# Patient Record
Sex: FEMALE | Race: WHITE | NOT HISPANIC OR LATINO | URBAN - METROPOLITAN AREA
[De-identification: names, ages, dates, MRNs, and addresses within clinical notes are randomized per-mention and may not be internally consistent; named-entity substitution may affect disease eponyms.]

---

## 2017-11-02 ENCOUNTER — OUTPATIENT (OUTPATIENT)
Dept: OUTPATIENT SERVICES | Facility: HOSPITAL | Age: 47
LOS: 1 days | Discharge: HOME | End: 2017-11-02

## 2017-11-03 DIAGNOSIS — F11.10 OPIOID ABUSE, UNCOMPLICATED: ICD-10-CM

## 2023-02-02 ENCOUNTER — EMERGENCY (EMERGENCY)
Facility: HOSPITAL | Age: 53
LOS: 1 days | Discharge: HOME | End: 2023-02-02
Attending: EMERGENCY MEDICINE | Admitting: EMERGENCY MEDICINE
Payer: MEDICARE

## 2023-02-02 VITALS
RESPIRATION RATE: 18 BRPM | OXYGEN SATURATION: 100 % | DIASTOLIC BLOOD PRESSURE: 75 MMHG | TEMPERATURE: 98 F | SYSTOLIC BLOOD PRESSURE: 148 MMHG | HEART RATE: 91 BPM

## 2023-02-02 PROCEDURE — 99284 EMERGENCY DEPT VISIT MOD MDM: CPT

## 2023-02-02 RX ADMIN — Medication 100 MILLIGRAM(S): at 16:30

## 2023-02-02 NOTE — ED PROVIDER NOTE - NSICDXPASTMEDICALHX_GEN_ALL_CORE_FT
PAST MEDICAL HISTORY:  Bipolar disorder     Opioid use disorder, moderate, in early remission, on maintenance therapy, dependence     RA (rheumatoid arthritis)

## 2023-02-02 NOTE — ED PROVIDER NOTE - CLINICAL SUMMARY MEDICAL DECISION MAKING FREE TEXT BOX
51 y/o F with history of recurrent MRSA infections coming in with multiple worsening erythematous lesions with associated drainage and tenderness. Pt denies fever, chills. PE with multiple lesions in various phases of healing with the largest on the left lower calf with significant surrounding erythema.     Plan: xray lower LT leg, d/c with doxycycline.

## 2023-02-02 NOTE — ED PROVIDER NOTE - PROGRESS NOTE DETAILS
Robb PGY1: Patient denies X-ray. Patient counseled on the importance of compliance with full course of doxycycline. Verbalized understanding.

## 2023-02-02 NOTE — ED ADULT TRIAGE NOTE - CHIEF COMPLAINT QUOTE
pt admits to last heroin use a few  days ago. Pt she is here for left leg open sores that appear infected. Pts leg is swollen

## 2023-02-02 NOTE — ED PROVIDER NOTE - SKIN, MLM
Pt with open lesion on anterior left calf with significant surrounding erythema, no active drainage. Pt with similar smaller lesions on RT posterior shoulder and 2 on lower back. Multiple scattered lesions on B/L lower legs in different stages of healing.

## 2023-02-02 NOTE — ED PROVIDER NOTE - ATTENDING CONTRIBUTION TO CARE
The patient is a 52y Female who has a past medical and surgery history of RA Substance abuse BipolarPTED with multiple pustules in various stages of healing; the largest and the one prompting the ED isit on her left lower leg; no f,c,streaking up leg wound was responding to an incomplete prescription of doxy but recurred when she ran out   Vital Signs Stable  PE: as described; my additions and exceptions are noted in the chart     IMPRESSION/RISK:  Dx= CAMRSA    Consideration include Doxycycline x 10 days with refill ID referral  Plan  as above  RTEDprn The patient is a 52y Female who has a past medical and surgery history of RA Substance abuse Bipolar PTED with multiple pustules in various stages of healing; the largest and the one prompting the ED visit on her left lower leg; no f,c,streaking up leg wound was responding to an incomplete prescription of doxy but recurred when she ran out   Vital Signs Stable  PE: as described; my additions and exceptions are noted in the chart     IMPRESSION/RISK:  Dx= CAMRSA    Consideration include Doxycycline x 10 days with refill ID referral  Plan  as above  RTEDprn

## 2023-02-02 NOTE — ED PROVIDER NOTE - PATIENT PORTAL LINK FT
You can access the FollowMyHealth Patient Portal offered by [facility] by registering at the following website: [email or web address]. By joining [employer]’s FollowMyHealth portal, you will also be able to view your health information using other applications (apps) compatible with our system.

## 2023-07-16 ENCOUNTER — EMERGENCY (EMERGENCY)
Facility: HOSPITAL | Age: 53
LOS: 1 days | Discharge: LEFT BEFORE DISCHARGE | End: 2023-07-16
Attending: EMERGENCY MEDICINE | Admitting: EMERGENCY MEDICINE
Payer: MEDICARE

## 2023-07-16 VITALS
OXYGEN SATURATION: 96 % | TEMPERATURE: 98 F | HEART RATE: 105 BPM | DIASTOLIC BLOOD PRESSURE: 101 MMHG | RESPIRATION RATE: 18 BRPM | SYSTOLIC BLOOD PRESSURE: 157 MMHG

## 2023-07-16 VITALS
HEART RATE: 100 BPM | DIASTOLIC BLOOD PRESSURE: 73 MMHG | OXYGEN SATURATION: 96 % | SYSTOLIC BLOOD PRESSURE: 135 MMHG | TEMPERATURE: 98 F | RESPIRATION RATE: 16 BRPM

## 2023-07-16 PROBLEM — M06.9 RHEUMATOID ARTHRITIS, UNSPECIFIED: Chronic | Status: ACTIVE | Noted: 2023-02-02

## 2023-07-16 PROBLEM — F31.9 BIPOLAR DISORDER, UNSPECIFIED: Chronic | Status: ACTIVE | Noted: 2023-02-02

## 2023-07-16 LAB
ALBUMIN SERPL ELPH-MCNC: 4.4 G/DL — SIGNIFICANT CHANGE UP (ref 3.3–5)
ALP SERPL-CCNC: 77 U/L — SIGNIFICANT CHANGE UP (ref 40–120)
ALT FLD-CCNC: 20 U/L — SIGNIFICANT CHANGE UP (ref 4–33)
ANION GAP SERPL CALC-SCNC: 15 MMOL/L — HIGH (ref 7–14)
AST SERPL-CCNC: 34 U/L — HIGH (ref 4–32)
BASOPHILS # BLD AUTO: 0.04 K/UL — SIGNIFICANT CHANGE UP (ref 0–0.2)
BASOPHILS NFR BLD AUTO: 0.7 % — SIGNIFICANT CHANGE UP (ref 0–2)
BILIRUB SERPL-MCNC: 0.7 MG/DL — SIGNIFICANT CHANGE UP (ref 0.2–1.2)
BUN SERPL-MCNC: 10 MG/DL — SIGNIFICANT CHANGE UP (ref 7–23)
CALCIUM SERPL-MCNC: 9.5 MG/DL — SIGNIFICANT CHANGE UP (ref 8.4–10.5)
CHLORIDE SERPL-SCNC: 99 MMOL/L — SIGNIFICANT CHANGE UP (ref 98–107)
CO2 SERPL-SCNC: 23 MMOL/L — SIGNIFICANT CHANGE UP (ref 22–31)
CREAT SERPL-MCNC: 0.81 MG/DL — SIGNIFICANT CHANGE UP (ref 0.5–1.3)
EGFR: 88 ML/MIN/1.73M2 — SIGNIFICANT CHANGE UP
EOSINOPHIL # BLD AUTO: 0.1 K/UL — SIGNIFICANT CHANGE UP (ref 0–0.5)
EOSINOPHIL NFR BLD AUTO: 1.8 % — SIGNIFICANT CHANGE UP (ref 0–6)
GLUCOSE SERPL-MCNC: 92 MG/DL — SIGNIFICANT CHANGE UP (ref 70–99)
HCT VFR BLD CALC: 43.6 % — SIGNIFICANT CHANGE UP (ref 34.5–45)
HGB BLD-MCNC: 13.7 G/DL — SIGNIFICANT CHANGE UP (ref 11.5–15.5)
IANC: 3.39 K/UL — SIGNIFICANT CHANGE UP (ref 1.8–7.4)
IMM GRANULOCYTES NFR BLD AUTO: 0.2 % — SIGNIFICANT CHANGE UP (ref 0–0.9)
LYMPHOCYTES # BLD AUTO: 1.53 K/UL — SIGNIFICANT CHANGE UP (ref 1–3.3)
LYMPHOCYTES # BLD AUTO: 27.8 % — SIGNIFICANT CHANGE UP (ref 13–44)
MAGNESIUM SERPL-MCNC: 2.2 MG/DL — SIGNIFICANT CHANGE UP (ref 1.6–2.6)
MCHC RBC-ENTMCNC: 26 PG — LOW (ref 27–34)
MCHC RBC-ENTMCNC: 31.4 GM/DL — LOW (ref 32–36)
MCV RBC AUTO: 82.9 FL — SIGNIFICANT CHANGE UP (ref 80–100)
MONOCYTES # BLD AUTO: 0.44 K/UL — SIGNIFICANT CHANGE UP (ref 0–0.9)
MONOCYTES NFR BLD AUTO: 8 % — SIGNIFICANT CHANGE UP (ref 2–14)
NEUTROPHILS # BLD AUTO: 3.39 K/UL — SIGNIFICANT CHANGE UP (ref 1.8–7.4)
NEUTROPHILS NFR BLD AUTO: 61.5 % — SIGNIFICANT CHANGE UP (ref 43–77)
NRBC # BLD: 0 /100 WBCS — SIGNIFICANT CHANGE UP (ref 0–0)
NRBC # FLD: 0 K/UL — SIGNIFICANT CHANGE UP (ref 0–0)
PHOSPHATE SERPL-MCNC: 4 MG/DL — SIGNIFICANT CHANGE UP (ref 2.5–4.5)
PLATELET # BLD AUTO: 166 K/UL — SIGNIFICANT CHANGE UP (ref 150–400)
POTASSIUM SERPL-MCNC: 3.8 MMOL/L — SIGNIFICANT CHANGE UP (ref 3.5–5.3)
POTASSIUM SERPL-SCNC: 3.8 MMOL/L — SIGNIFICANT CHANGE UP (ref 3.5–5.3)
PROT SERPL-MCNC: 8.1 G/DL — SIGNIFICANT CHANGE UP (ref 6–8.3)
RBC # BLD: 5.26 M/UL — HIGH (ref 3.8–5.2)
RBC # FLD: 14.5 % — SIGNIFICANT CHANGE UP (ref 10.3–14.5)
SODIUM SERPL-SCNC: 137 MMOL/L — SIGNIFICANT CHANGE UP (ref 135–145)
WBC # BLD: 5.51 K/UL — SIGNIFICANT CHANGE UP (ref 3.8–10.5)
WBC # FLD AUTO: 5.51 K/UL — SIGNIFICANT CHANGE UP (ref 3.8–10.5)

## 2023-07-16 PROCEDURE — 99284 EMERGENCY DEPT VISIT MOD MDM: CPT

## 2023-07-16 PROCEDURE — 93010 ELECTROCARDIOGRAM REPORT: CPT

## 2023-07-16 NOTE — ED PROVIDER NOTE - OBJECTIVE STATEMENT
53 yo Female with PMhx MRSA cellulitis, RA, IVDU, opioid use disorder on methadone, bipolar disorder, p/w wounds on her bilateral forearms after IVDU 3 days ago. Patient states that she used what she thought to be heroin 3 days ago. Had the specimen tested and was found to have xylazine. Patient with ulcerative lesions on bilateral arms. Patient states that she use small amount of the IV drug in multiple locations on her bilateral arms. Patient denies fever, nausea/vomiting, pain of the area, CP, SOB, abdominal pain.

## 2023-07-16 NOTE — ED ADULT NURSE REASSESSMENT NOTE - NS ED NURSE REASSESS COMMENT FT1
at 8:40 navigation arrives to take pt to vascular studies, pt not found int he assigned area, bathrooms, and  radiology suites searched pt not located, pt's contact number called message left, no call back received. NANETTE Ramos mad aware, Dr Walker made aware, At this time concern for  pt eloping with IV line in place, pt with Hx of heroin use in the past.

## 2023-07-16 NOTE — ED ADULT NURSE REASSESSMENT NOTE - NS ED NURSE REASSESS COMMENT FT1
At 08:10: Received pt in bed  A and Ox 3 in NAD, resting at the edge of the bed, ambulatory at time, update pt on need for vascular studies pt verbalized underscan, endorses no complaints at  this time.

## 2023-07-16 NOTE — ED ADULT NURSE REASSESSMENT NOTE - NS ED NURSE REASSESS COMMENT FT1
ANM: Called [address] and spoke to Reji #[id number] who states wellness check done at 14:30. diana made aware.
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
## 2023-07-16 NOTE — ED PROVIDER NOTE - CLINICAL SUMMARY MEDICAL DECISION MAKING FREE TEXT BOX
51 yo Female with PMhx MRSA cellulitis, RA, IVDU, opioid use disorder on methadone, bipolar disorder, p/w wounds on her bilateral forearms after IVDU 3 days ago. Vitals with mild tachycardia to low 100s, afebrile, BP stable. bilateral arms with point wounds of forearms with some erythema surrounding wounds. hardened skins connecting between each wound. The differential diagnosis includes but is not limited to necrosis 2/2 xylazine, micro-abscesses, DVT, superficial thrombophlebitis. Will get labs, US.

## 2023-07-16 NOTE — ED ADULT TRIAGE NOTE - CHIEF COMPLAINT QUOTE
PA approved and pt notified to call   pt c/o infection in right arm from IV drug use. reports last drug use (heroin) 3 days ago. pt takes methadone (clinic in [place]). denies nausea, vomiting, fever, chills. history of asthma.

## 2023-07-16 NOTE — ED ADULT NURSE NOTE - OBJECTIVE STATEMENT
A&Ox4. Past medical history of asthma. Patient c/o infection in right arm from IV drug use. Reports last drug use (heroin) 3 days ago. Patient takes methadone (clinic in [place]). Denies nausea, vomiting, fever, chills. history of asthma. Respirations even and unlabored. Labs obtained, awaiting results. Awaiting diagnostic testing. Safety precautions in place, bed in lowest position and oriented to call bell.

## 2023-07-16 NOTE — ED PROVIDER NOTE - ATTENDING CONTRIBUTION TO CARE
52-year-old female with history of opioid use disorder enrolled in methadone program, MRSA, bipolar disorder, rheumatoid arthritis, presenting to ER with right forearm pain with nodular areas of swelling and discoloration at site of subcutaneous heroin injection from 3 days ago.  Patient notes she tested her heroin with a strip and detected xylazine contamination in the hair when she injected.  Denies any proximal right arm pain, swelling, redness.  No shortness of breath or chest pain, no fevers, no other areas of injection or symptoms    Exam  Flexural surface of right forearm with nodular brown to reddish colored areas with no significant tenderness or fluctuance or crepitus  Compartments soft  2+ radial pulse on right  Lungs clear bilaterally    Assessment/plan  Concern for possible xylazine side effect with skin necrosis, small abscesses, thrombophlebitis/superficial thrombus  We will get labs to screen for infection, upper extremity ultrasound  Dispo pending results

## 2023-07-16 NOTE — ED ADULT NURSE NOTE - CHIEF COMPLAINT QUOTE
pt c/o infection in right arm from IV drug use. reports last drug use (heroin) 3 days ago. pt takes methadone (clinic in [place]). denies nausea, vomiting, fever, chills. history of asthma.

## 2023-07-16 NOTE — ED ADULT NURSE REASSESSMENT NOTE - NS ED NURSE REASSESS COMMENT FT1
ANM - Multiple attempts to call patient, no response. 911 called by author of this note and spoke to  #[phone] for wellness check. Nurse manager and a.d.n aware.

## 2023-07-16 NOTE — ED PROVIDER NOTE - PHYSICAL EXAMINATION
Const: not in acute distress  Eyes: no conjunctival injection  HEENT: Head NCAT, Moist MM.  Neck: Trachea midline.   CVS: +S1/S2, Peripheral pulses 2+ and equal in all extremities.  RESP: Unlabored respiratory effort. Clear to auscultation bilaterally.  GI: Nontender/Nondistended, No CVA tenderness b/l.   MSK: Normocephalic/Atraumatic, No Lower Extremities edema b/l.   Skin: bilateral forearms with circular 2-3 cm wounds with some erythema surrounding wounds, hardened tubular structure connecting between each wound  Neuro: Motor & Sensation grossly intact.  Psych: Awake, Alert, & Cooperative

## 2023-07-28 ENCOUNTER — EMERGENCY (EMERGENCY)
Facility: HOSPITAL | Age: 53
LOS: 1 days | Discharge: HOME | End: 2023-07-28
Attending: EMERGENCY MEDICINE | Admitting: EMERGENCY MEDICINE
Payer: MEDICARE

## 2023-07-28 VITALS
RESPIRATION RATE: 16 BRPM | SYSTOLIC BLOOD PRESSURE: 154 MMHG | DIASTOLIC BLOOD PRESSURE: 100 MMHG | HEART RATE: 119 BPM | TEMPERATURE: 98 F | OXYGEN SATURATION: 99 %

## 2023-07-28 PROCEDURE — 99284 EMERGENCY DEPT VISIT MOD MDM: CPT

## 2023-07-28 NOTE — ED PROVIDER NOTE - CLINICAL SUMMARY MEDICAL DECISION MAKING FREE TEXT BOX
53-year-old female with past medical history of MRSA cellulitis, RA on methotrexate, IV drug use last used 3 days ago, opioid use disorder, bipolar disorder presenting to emergency department with abscess to right side of neck that has been increasing in size over the past 3 days.  States started as a ingrown hair on her neck that turned into a pimple that she popped.  Was seen in emergency department 2 weeks prior for ulcerative lesions on bilateral arms after injecting heroin found to be contaminated with xylazine.  Patient left without treatment at that time but she states she got very anxious, left department without informing anybody.  States abscess on neck is warm, red, tender.  Not actively draining.  Denies fever, headache, shortness of breath, chest pain, abdominal pain, nausea, vomiting, diarrhea.  States wounds on arms legs have improved and are no longer painful.    Patient tachycardic to 119 in triage, patient very anxious appearing likely contributing to elevated heart rate.  Patient is afebrile, rest of vital signs within normal limits.  On exam patient has roughly 2-3 cm area of induration and erythema and warmth to right anterior neck with appearance of abscess/cellulitis, no fluctuance appreciated.  Evaluated abscess with ultrasound, no drainable pocket.  Will require antibiotics, especially with patient's known history of MRSA, will Rx Bactrim.  Offered admission for cellulitis, labs, IV antibiotics.  Patient declining at this time stating she gets very anxious in hospitals and does not want to stay.  Patient alert and oriented with decision-making capacity, understands risks of leaving.  Gave return precautions, patient will return if symptoms not improving.  Answered all questions. Will dc with bactrim Rx. 53-year-old female with past medical history of MRSA cellulitis, RA on methotrexate, IV drug use last used 3 days ago, opioid use disorder, bipolar disorder presenting to emergency department with abscess to right side of neck that has been increasing in size over the past 3 days.  States started as a ingrown hair on her neck that turned into a pimple that she popped.  Was seen in emergency department 2 weeks prior for ulcerative lesions on bilateral arms after injecting heroin found to be contaminated with xylazine.  Patient left without treatment at that time but she states she got very anxious, left department without informing anybody.  States abscess on neck is warm, red, tender.  Not actively draining.  Denies fever, headache, shortness of breath, chest pain, abdominal pain, nausea, vomiting, diarrhea.  States wounds on arms legs have improved and are no longer painful.    Patient tachycardic to 119 in triage, patient very anxious appearing likely contributing to elevated heart rate. When examined in room, HR palpated radially to be 90. Patient is afebrile, rest of vital signs within normal limits.  On exam patient has roughly 2-3 cm area of induration and erythema and warmth to right anterior neck with appearance of abscess/cellulitis, no fluctuance appreciated.  Evaluated abscess with ultrasound, no drainable pocket.  Will require antibiotics, especially with patient's known history of MRSA, will Rx Bactrim.  Offered admission for cellulitis, labs, IV antibiotics.  Patient declining at this time stating she gets very anxious in hospitals and does not want to stay.  Patient alert and oriented with decision-making capacity, understands risks of leaving.  Gave return precautions, patient will return if symptoms not improving.  Answered all questions. Will dc with bactrim Rx.

## 2023-07-28 NOTE — ED ADULT TRIAGE NOTE - CHIEF COMPLAINT QUOTE
c/o redness ,swelling to neck x 3-4 days. PHx asthma, RA, Bipolar, anxiety. Right anterior neck noted to have redness, swelling and warm to touch.

## 2023-07-28 NOTE — ED PROVIDER NOTE - PATIENT PORTAL LINK FT
You can access the FollowMyHealth Patient Portal offered by [facility] by registering at the following website: [email or web address]. By joining [employer]’s FollowMyHealth portal, you will also be able to view your health information using other applications (apps) compatible with our system.

## 2023-07-28 NOTE — ED PROVIDER NOTE - ATTENDING CONTRIBUTION TO CARE
DR. RICHARDSON, ATTENDING MD-  I performed a face to face bedside interview with the patient regarding history of present illness, review of symptoms and past medical history. I completed an independent physical exam.  I have discussed the patient's plan of care with the resident.   Documentation as above in the note.    54 y/o female h/o ivda with r neck pain swelling redness.  Pt with area of induration and erythema, no fluctuance at r neck.  No issues breathing speaking swallowing.  Denies f/c.  Likely cellulitis.  Pt declines labs or admission.  Will give abx, advise close pcp f/u, to return immediately for worsening sx.

## 2023-07-28 NOTE — ED PROVIDER NOTE - PHYSICAL EXAMINATION
Gen: NAD  HEENT: Poor dentition. mucous membranes moist  CV: RRR, +S1/S2, no M/R/G, 2+ radial pulses b/l  Resp: CTAB, no W/R/R, no accessory muscle use, no increased work of breathing  GI: Abdomen soft non-distended, NTTP  Skin: 2-3cm area of induration, erythema, warmth with central ulceration to Right anterior neck without active drainge. +multiple healed skin ulcerations to b/l antecubital fossa and left shin.   Neuro: A&Ox4, following commands, moving all four extremities spontaneously  Psych: appropriate mood

## 2023-07-28 NOTE — ED PROVIDER NOTE - WET READ LAUNCH FT
There are no Wet Read(s) to document. Itraconazole Pregnancy And Lactation Text: This medication is Pregnancy Category C and it isn't know if it is safe during pregnancy. It is also excreted in breast milk.

## 2023-09-26 ENCOUNTER — EMERGENCY (EMERGENCY)
Facility: HOSPITAL | Age: 53
LOS: 1 days | Discharge: LEFT BEFORE DISCHARGE | End: 2023-09-26
Attending: STUDENT IN AN ORGANIZED HEALTH CARE EDUCATION/TRAINING PROGRAM | Admitting: EMERGENCY MEDICINE
Payer: MEDICARE

## 2023-09-26 VITALS
OXYGEN SATURATION: 100 % | HEART RATE: 90 BPM | SYSTOLIC BLOOD PRESSURE: 170 MMHG | DIASTOLIC BLOOD PRESSURE: 80 MMHG | TEMPERATURE: 98 F | RESPIRATION RATE: 17 BRPM

## 2023-09-26 VITALS
DIASTOLIC BLOOD PRESSURE: 83 MMHG | HEART RATE: 72 BPM | RESPIRATION RATE: 16 BRPM | SYSTOLIC BLOOD PRESSURE: 129 MMHG | OXYGEN SATURATION: 100 %

## 2023-09-26 LAB
ALBUMIN SERPL ELPH-MCNC: 3.9 G/DL — SIGNIFICANT CHANGE UP (ref 3.3–5)
ALP SERPL-CCNC: 84 U/L — SIGNIFICANT CHANGE UP (ref 40–120)
ALT FLD-CCNC: 18 U/L — SIGNIFICANT CHANGE UP (ref 4–33)
ANION GAP SERPL CALC-SCNC: 10 MMOL/L — SIGNIFICANT CHANGE UP (ref 7–14)
AST SERPL-CCNC: 21 U/L — SIGNIFICANT CHANGE UP (ref 4–32)
BASE EXCESS BLDV CALC-SCNC: 0.5 MMOL/L — SIGNIFICANT CHANGE UP (ref -2–3)
BASOPHILS # BLD AUTO: 0.04 K/UL — SIGNIFICANT CHANGE UP (ref 0–0.2)
BASOPHILS NFR BLD AUTO: 0.6 % — SIGNIFICANT CHANGE UP (ref 0–2)
BILIRUB SERPL-MCNC: 0.3 MG/DL — SIGNIFICANT CHANGE UP (ref 0.2–1.2)
BLOOD GAS VENOUS COMPREHENSIVE RESULT: SIGNIFICANT CHANGE UP
BUN SERPL-MCNC: 5 MG/DL — LOW (ref 7–23)
CALCIUM SERPL-MCNC: 9.4 MG/DL — SIGNIFICANT CHANGE UP (ref 8.4–10.5)
CHLORIDE BLDV-SCNC: 98 MMOL/L — SIGNIFICANT CHANGE UP (ref 96–108)
CHLORIDE SERPL-SCNC: 98 MMOL/L — SIGNIFICANT CHANGE UP (ref 98–107)
CO2 BLDV-SCNC: 30.2 MMOL/L — HIGH (ref 22–26)
CO2 SERPL-SCNC: 24 MMOL/L — SIGNIFICANT CHANGE UP (ref 22–31)
CREAT SERPL-MCNC: 0.77 MG/DL — SIGNIFICANT CHANGE UP (ref 0.5–1.3)
EGFR: 93 ML/MIN/1.73M2 — SIGNIFICANT CHANGE UP
EOSINOPHIL # BLD AUTO: 0.11 K/UL — SIGNIFICANT CHANGE UP (ref 0–0.5)
EOSINOPHIL NFR BLD AUTO: 1.6 % — SIGNIFICANT CHANGE UP (ref 0–6)
GAS PNL BLDV: 126 MMOL/L — LOW (ref 136–145)
GLUCOSE BLDV-MCNC: 81 MG/DL — SIGNIFICANT CHANGE UP (ref 70–99)
GLUCOSE SERPL-MCNC: 88 MG/DL — SIGNIFICANT CHANGE UP (ref 70–99)
HCO3 BLDV-SCNC: 28 MMOL/L — SIGNIFICANT CHANGE UP (ref 22–29)
HCT VFR BLD CALC: 42 % — SIGNIFICANT CHANGE UP (ref 34.5–45)
HCT VFR BLDA CALC: 41 % — SIGNIFICANT CHANGE UP (ref 34.5–46.5)
HGB BLD CALC-MCNC: 13.8 G/DL — SIGNIFICANT CHANGE UP (ref 11.7–16.1)
HGB BLD-MCNC: 13.7 G/DL — SIGNIFICANT CHANGE UP (ref 11.5–15.5)
IANC: 4.85 K/UL — SIGNIFICANT CHANGE UP (ref 1.8–7.4)
IMM GRANULOCYTES NFR BLD AUTO: 0.3 % — SIGNIFICANT CHANGE UP (ref 0–0.9)
LACTATE BLDV-MCNC: 1.7 MMOL/L — SIGNIFICANT CHANGE UP (ref 0.5–2)
LYMPHOCYTES # BLD AUTO: 1.5 K/UL — SIGNIFICANT CHANGE UP (ref 1–3.3)
LYMPHOCYTES # BLD AUTO: 21.2 % — SIGNIFICANT CHANGE UP (ref 13–44)
MCHC RBC-ENTMCNC: 27 PG — SIGNIFICANT CHANGE UP (ref 27–34)
MCHC RBC-ENTMCNC: 32.6 GM/DL — SIGNIFICANT CHANGE UP (ref 32–36)
MCV RBC AUTO: 82.7 FL — SIGNIFICANT CHANGE UP (ref 80–100)
MONOCYTES # BLD AUTO: 0.54 K/UL — SIGNIFICANT CHANGE UP (ref 0–0.9)
MONOCYTES NFR BLD AUTO: 7.6 % — SIGNIFICANT CHANGE UP (ref 2–14)
NEUTROPHILS # BLD AUTO: 4.85 K/UL — SIGNIFICANT CHANGE UP (ref 1.8–7.4)
NEUTROPHILS NFR BLD AUTO: 68.7 % — SIGNIFICANT CHANGE UP (ref 43–77)
NRBC # BLD: 0 /100 WBCS — SIGNIFICANT CHANGE UP (ref 0–0)
NRBC # FLD: 0 K/UL — SIGNIFICANT CHANGE UP (ref 0–0)
PCO2 BLDV: 59 MMHG — HIGH (ref 39–52)
PH BLDV: 7.29 — LOW (ref 7.32–7.43)
PLATELET # BLD AUTO: 190 K/UL — SIGNIFICANT CHANGE UP (ref 150–400)
PO2 BLDV: 41 MMHG — SIGNIFICANT CHANGE UP (ref 25–45)
POTASSIUM BLDV-SCNC: 4.5 MMOL/L — SIGNIFICANT CHANGE UP (ref 3.5–5.1)
POTASSIUM SERPL-MCNC: 4.7 MMOL/L — SIGNIFICANT CHANGE UP (ref 3.5–5.3)
POTASSIUM SERPL-SCNC: 4.7 MMOL/L — SIGNIFICANT CHANGE UP (ref 3.5–5.3)
PROT SERPL-MCNC: 7.6 G/DL — SIGNIFICANT CHANGE UP (ref 6–8.3)
RBC # BLD: 5.08 M/UL — SIGNIFICANT CHANGE UP (ref 3.8–5.2)
RBC # FLD: 13.5 % — SIGNIFICANT CHANGE UP (ref 10.3–14.5)
SAO2 % BLDV: 62.2 % — LOW (ref 67–88)
SODIUM SERPL-SCNC: 132 MMOL/L — LOW (ref 135–145)
WBC # BLD: 7.06 K/UL — SIGNIFICANT CHANGE UP (ref 3.8–10.5)
WBC # FLD AUTO: 7.06 K/UL — SIGNIFICANT CHANGE UP (ref 3.8–10.5)

## 2023-09-26 PROCEDURE — 99285 EMERGENCY DEPT VISIT HI MDM: CPT

## 2023-09-26 PROCEDURE — 70491 CT SOFT TISSUE NECK W/DYE: CPT | Mod: 26,MA

## 2023-09-26 RX ADMIN — Medication 100 MILLIGRAM(S): at 17:59

## 2023-09-26 NOTE — ED ADULT NURSE REASSESSMENT NOTE - NS ED NURSE REASSESS COMMENT FT1
Break RN: As per SUHAIL Morelos, pt does not want to stay. Pt to AMA. IV removed. Vitals performed. Ambulatory with a steady gait. No acute distress noted upon leaving ED.

## 2023-09-26 NOTE — ED PROVIDER NOTE - PHYSICAL EXAMINATION
Const: Well-nourished, Well-developed, appearing stated age.  Eyes: no conjunctival injection, and symmetrical lids.  HEENT: +  Right periorbital edema extending to the right scalp.  Neck: +R sided ttp, swelling. trachea midline.   CVS: +S1/S2, Radial and DP pulses 2+ b/l.   RESP: Unlabored respiratory effort. Clear to auscultation bilaterally.  GI: Nontender/Nondistended, No CVA tenderness b/l.   MSK: Normocephalic/Atraumatic, Lower Extremities w/o edema b/l.   Skin: Warm, dry and intact.   Neuro: Fluent Speech. Moving all extremities.   Psych: Awake, Alert, & Oriented (AAO) x3. Appropriate mood and affect.

## 2023-09-26 NOTE — ED PROVIDER NOTE - PATIENT PORTAL LINK FT
You can access the FollowMyHealth Patient Portal offered by [facility] by registering at the following website: [email or web address]. By joining [employer]’s FollowMyHealth portal, you will also be able to view your health information using other applications (apps) compatible with our system.
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
## 2023-09-26 NOTE — ED PROVIDER NOTE - OBJECTIVE STATEMENT
53-year-old female with past medical history of IV drug use, presenting with chief complaint of right-sided facial swelling extending from the right scalp to the right periorbital area, and the right neck.  No vision changes, no pain in extraocular movements.  States that she has had a few days of symptoms.  No fevers, chills, nausea, vomiting.  No other complaints at this time.  Patient has not used any IV drugs for the past few months.  Is now on methadone.

## 2023-09-26 NOTE — ED ADULT TRIAGE NOTE - CHIEF COMPLAINT QUOTE
Pt coming to ER c/o swelling and burning pain to her periorbital region that started two days ago. Noted to be warm to touch and causing redness to the right side of her face. Denies vision changes. Hx drug abuse, RA, HTN

## 2023-09-26 NOTE — ED ADULT NURSE NOTE - OBJECTIVE STATEMENT
Patient received in wellness, exam room 5. Patient A&Ox3 and ambulatory at baseline. Patient presents to the ED c/o right sided facial swelling. phx opioid use. Patient states approximately 2 days ago, she started to develop itching to right side of face. Patient states she is allergic to wool and helped her friend do laundry approximately 3 days ago; patient is unsure if that may have caused reaction. Right side of face and periorbital appears swolllen and red; patient denies vision changes, headache, lightheadedness, dizziness, fever/chills, nausea/vomiting. Patient denies pain and offers no complaints at this time. Respirations even and unlabored, no signs/symptoms of acute distress; patient denies dyspnea, shortness of breath, and chest pain. Patient is stable at this time. Steady gait observed.

## 2023-09-26 NOTE — ED PROVIDER NOTE - CLINICAL SUMMARY MEDICAL DECISION MAKING FREE TEXT BOX
Bryant Peoples, ED Attendin-year-old female presenting with chief complaint of right facial swelling neck swelling.  History of IV drug use.  On exam, vital signs stable, right periorbital swelling as well as right neck swelling as described above.  Concern for cellulitis versus abscess.  Given patient's history, will start patient on clindamycin.  Will obtain labs as well as CT of the max face and neck with IV contrast.  Reassess to dispo.

## 2023-09-26 NOTE — ED PROVIDER NOTE - NSFOLLOWUPINSTRUCTIONS_ED_ALL_ED_FT
Discharge Against Medical Advice WHAT YOU NEED TO KNOW:    Discharge against medical advice means that you choose to leave the hospital before your healthcare provider recommends that you do. Your healthcare provider may still need to diagnose or treat your condition or your treatment may not be complete.    DISCHARGE INSTRUCTIONS:    Risks: Risks of leaving the hospital before your healthcare providers recommend include the following:  Your condition may cause other health problems if not treated properly.  You may need to be admitted to the hospital again for the same condition.  Your condition could become life-threatening and result in your death  Follow up with your healthcare provider as directed: Write down your questions so you remember to ask them during your visits.      Patient Signature ______________________________________________Date/Time            Provider Signature  ____________________________________________Date/Time        [facility]  Otolaryngology (ENT)  [address] [place] [address]  Phone: [phone]    Shant Perez  OTOLARYNGOLOGY  [address] [place] [address], [address]  [place] [address]  Phone: [phone]  Fax: [phone]  Established Patient      Parotitis       Parotitis is inflammation of one or both of your parotid glands. These glands produce saliva. They are found on each side of your face, below and in front of your earlobes. The saliva that they produce comes out of tiny openings (ducts) inside your cheeks.    Parotitis may cause sudden swelling and pain (acute parotitis). It can also cause repeated episodes of swelling and pain or continued swelling that may or may not be painful (chronic parotitis).    What are the causes?  This condition may be caused by:    Infections from bacteria.  Infections from viruses, such as mumps or HIV.  Blockage (obstruction) of saliva flow through the parotid glands. This can be from a stone, scar tissue, or a tumor.  Diseases that cause your body's defense system (immune system) to attack healthy cells in your salivary glands. These are called autoimmune diseases.    What increases the risk?  You are more likely to develop this condition if:    You are 50 years old or older.  You do not drink enough fluids (are dehydrated).  You drink too much alcohol.  You have:    A dry mouth.  Poor dental hygiene.  Diabetes.  Gout.  A long-term illness.  You have had radiation treatments to the head and neck.  You take certain medicines.    What are the signs or symptoms?  Symptoms of this condition depend on the cause. Symptoms may include:    Swelling under and in front of the ear. This may get worse after eating.  Redness of the skin over the parotid gland.  Pain and tenderness over the parotid gland. This may get worse after eating.  Fever or chills.  Pus coming from the ducts inside the mouth.  Dry mouth.  A bad taste in the mouth.    How is this diagnosed?  This condition may be diagnosed based on:    Your medical history.  A physical exam.  Tests to find the cause of the parotitis. These may include:    Doing blood tests to check for an autoimmune disease or infections from a virus.  Taking a fluid sample from the parotid gland and testing it for infection.  Injecting the ducts of the parotid gland with a dye and then taking X-rays (sialogram).  Having other imaging tests of the gland, such as X-rays, ultrasound, MRI, or CT scan.  Checking the opening of the gland for a stone or obstruction.  Placing a needle into the gland to remove tissue for a biopsy (fine needle aspiration).    How is this treated?  Treatment for this condition depends on the cause. Treatment may include:    Antibiotic medicine for a bacterial infection.  Drinking more fluids.  Removing a stone or obstruction.  Treating an underlying disease that is causing parotitis.  Surgery to drain an infection, remove a growth, or remove the whole gland (parotidectomy).    Treatment may not be needed if parotid swelling goes away with home care.    Follow these instructions at home:      Medicines     Take over-the-counter and prescription medicines only as told by your health care provider.  If you were prescribed an antibiotic medicine, take it as told by your health care provider. Do not stop taking the antibiotic even if you start to feel better.        Managing pain and swelling    If directed, apply heat to the affected area as often as told by your health care provider. Use the heat source that your health care provider recommends, such as a moist heat pack or a heating pad. To apply the heat:    Place a towel between your skin and the heat source.  Leave the heat on for 20–30 minutes.  Remove the heat if your skin turns bright red. This is especially important if you are unable to feel pain, heat, or cold. You may have a greater risk of getting burned.  Gargle with a salt-water mixture 3–4 times a day or as needed. To make a salt-water mixture, completely dissolve ½–1 tsp (3–6 g) of salt in 1 cup (237 mL) of warm water.  Gently massage the parotid glands as told by your health care provider.        General instructions     Drink enough fluid to keep your urine pale yellow.  Keep your mouth clean and moist.  Try sucking on sour candy. This may help to make your mouth less dry by stimulating the flow of saliva.  Maintain good oral health.    Brush your teeth at least two times a day.  Floss your teeth every day.  See your dentist regularly.  Do not use any products that contain nicotine or tobacco, such as cigarettes, e-cigarettes, and chewing tobacco. If you need help quitting, ask your health care provider.  Do not drink alcohol.  Keep all follow-up visits as told by your health care provider. This is important.    Contact a health care provider if:  You have a fever or chills.  You have new symptoms.  Your symptoms get worse.  Your symptoms do not improve with treatment.    Get help right away if:  You have difficulty breathing or swallowing because of the swollen gland.    Summary  Parotitis is inflammation of one or both of your parotid glands.  Symptoms include pain and swelling under and in front of the ear. They may also include a fever and a bad taste in your mouth.  This condition may be treated with antibiotics, increasing fluids, or surgery.  In some cases, parotitis may go away on its own without treatment.  You should drink plenty of fluids, maintain good oral hygiene, and avoid tobacco products.    ADDITIONAL NOTES AND INSTRUCTIONS    Please follow up with your Primary MD in 24-48 hr.  Seek immediate medical care for any new/worsening signs or symptoms.

## 2023-09-26 NOTE — ED PROVIDER NOTE - PROGRESS NOTE DETAILS
SUHAIL Duarte- received sign out from Dr. Peoples plan for admission for IV abx pending CT. CT results revealed parotitis. I suggested pt stay in the hospital for IV abx and pt deffering. states she has to go home because she will be evicted by her landlord. Pt requesting to leave ER. Risks of leaving have been discussed  such as misdiagnosis, worsening illness leading up to and including prolonged or permanent disability or death. Pt refusing further evaluation, treatment, or admission at this time. They appear clinically sober, AxOx3, free from distracting injury, appear to have intact insight, judgement, and reason and in my opinion have the capacity to make this decision. The patient was able to verbally state back in a coherent manner their current medical condition/current diagnosis, the proposes course of treatment, and the risks, benefits, and alternatives of treatment versus leaving against medical advice. They understand that they may return to seek medical attention here at whatever time they want. I highly advised them to return to the ED immediately if they experienced any new or worsening symptoms, reconsider treatment or had any other concerns. I recommended they follow-up within 24 hours for further evaluation and treatment. Discussed with Dr. Montero will switch pt to Augmentin for treatment of parotitis in immunocompetent patient. No hx of hospitalizations.

## 2024-03-12 ENCOUNTER — INPATIENT (INPATIENT)
Facility: HOSPITAL | Age: 54
LOS: 2 days | Discharge: LEFT BEFORE DISCHARGE | End: 2024-03-15
Attending: STUDENT IN AN ORGANIZED HEALTH CARE EDUCATION/TRAINING PROGRAM | Admitting: STUDENT IN AN ORGANIZED HEALTH CARE EDUCATION/TRAINING PROGRAM
Payer: MEDICARE

## 2024-03-12 VITALS
OXYGEN SATURATION: 98 % | TEMPERATURE: 98 F | SYSTOLIC BLOOD PRESSURE: 155 MMHG | HEART RATE: 113 BPM | DIASTOLIC BLOOD PRESSURE: 102 MMHG | RESPIRATION RATE: 16 BRPM

## 2024-03-12 VITALS
HEART RATE: 73 BPM | SYSTOLIC BLOOD PRESSURE: 130 MMHG | RESPIRATION RATE: 18 BRPM | TEMPERATURE: 98 F | DIASTOLIC BLOOD PRESSURE: 74 MMHG | OXYGEN SATURATION: 100 %

## 2024-03-12 DIAGNOSIS — L03.90 CELLULITIS, UNSPECIFIED: ICD-10-CM

## 2024-03-12 LAB
ALBUMIN SERPL ELPH-MCNC: 3.7 G/DL — SIGNIFICANT CHANGE UP (ref 3.3–5)
ALP SERPL-CCNC: 95 U/L — SIGNIFICANT CHANGE UP (ref 40–120)
ALT FLD-CCNC: 16 U/L — SIGNIFICANT CHANGE UP (ref 4–33)
ANION GAP SERPL CALC-SCNC: 12 MMOL/L — SIGNIFICANT CHANGE UP (ref 7–14)
AST SERPL-CCNC: 35 U/L — HIGH (ref 4–32)
BASE EXCESS BLDV CALC-SCNC: 2 MMOL/L — SIGNIFICANT CHANGE UP (ref -2–3)
BASOPHILS # BLD AUTO: 0.03 K/UL — SIGNIFICANT CHANGE UP (ref 0–0.2)
BASOPHILS NFR BLD AUTO: 0.4 % — SIGNIFICANT CHANGE UP (ref 0–2)
BILIRUB SERPL-MCNC: 0.6 MG/DL — SIGNIFICANT CHANGE UP (ref 0.2–1.2)
BUN SERPL-MCNC: 6 MG/DL — LOW (ref 7–23)
CA-I SERPL-SCNC: 1.21 MMOL/L — SIGNIFICANT CHANGE UP (ref 1.15–1.33)
CALCIUM SERPL-MCNC: 9.2 MG/DL — SIGNIFICANT CHANGE UP (ref 8.4–10.5)
CHLORIDE BLDV-SCNC: 95 MMOL/L — LOW (ref 96–108)
CHLORIDE SERPL-SCNC: 92 MMOL/L — LOW (ref 98–107)
CO2 BLDV-SCNC: 30.3 MMOL/L — HIGH (ref 22–26)
CO2 SERPL-SCNC: 24 MMOL/L — SIGNIFICANT CHANGE UP (ref 22–31)
CREAT SERPL-MCNC: 0.73 MG/DL — SIGNIFICANT CHANGE UP (ref 0.5–1.3)
EGFR: 100 ML/MIN/1.73M2 — SIGNIFICANT CHANGE UP
EOSINOPHIL # BLD AUTO: 0.07 K/UL — SIGNIFICANT CHANGE UP (ref 0–0.5)
EOSINOPHIL NFR BLD AUTO: 0.9 % — SIGNIFICANT CHANGE UP (ref 0–6)
GAS PNL BLDV: 126 MMOL/L — LOW (ref 136–145)
GAS PNL BLDV: SIGNIFICANT CHANGE UP
GLUCOSE BLDV-MCNC: 130 MG/DL — HIGH (ref 70–99)
GLUCOSE SERPL-MCNC: 119 MG/DL — HIGH (ref 70–99)
HCO3 BLDV-SCNC: 29 MMOL/L — SIGNIFICANT CHANGE UP (ref 22–29)
HCT VFR BLD CALC: 44.2 % — SIGNIFICANT CHANGE UP (ref 34.5–45)
HCT VFR BLDA CALC: 43 % — SIGNIFICANT CHANGE UP (ref 34.5–46.5)
HGB BLD CALC-MCNC: 14.3 G/DL — SIGNIFICANT CHANGE UP (ref 11.7–16.1)
HGB BLD-MCNC: 14.1 G/DL — SIGNIFICANT CHANGE UP (ref 11.5–15.5)
IANC: 6.15 K/UL — SIGNIFICANT CHANGE UP (ref 1.8–7.4)
IMM GRANULOCYTES NFR BLD AUTO: 0.3 % — SIGNIFICANT CHANGE UP (ref 0–0.9)
LACTATE BLDV-MCNC: 1.7 MMOL/L — SIGNIFICANT CHANGE UP (ref 0.5–2)
LYMPHOCYTES # BLD AUTO: 0.97 K/UL — LOW (ref 1–3.3)
LYMPHOCYTES # BLD AUTO: 12.6 % — LOW (ref 13–44)
MCHC RBC-ENTMCNC: 25.5 PG — LOW (ref 27–34)
MCHC RBC-ENTMCNC: 31.9 GM/DL — LOW (ref 32–36)
MCV RBC AUTO: 80.1 FL — SIGNIFICANT CHANGE UP (ref 80–100)
MONOCYTES # BLD AUTO: 0.46 K/UL — SIGNIFICANT CHANGE UP (ref 0–0.9)
MONOCYTES NFR BLD AUTO: 6 % — SIGNIFICANT CHANGE UP (ref 2–14)
MRSA PCR RESULT.: DETECTED
NEUTROPHILS # BLD AUTO: 6.15 K/UL — SIGNIFICANT CHANGE UP (ref 1.8–7.4)
NEUTROPHILS NFR BLD AUTO: 79.8 % — HIGH (ref 43–77)
NRBC # BLD: 0 /100 WBCS — SIGNIFICANT CHANGE UP (ref 0–0)
NRBC # FLD: 0 K/UL — SIGNIFICANT CHANGE UP (ref 0–0)
PCO2 BLDV: 52 MMHG — SIGNIFICANT CHANGE UP (ref 39–52)
PH BLDV: 7.35 — SIGNIFICANT CHANGE UP (ref 7.32–7.43)
PLATELET # BLD AUTO: 201 K/UL — SIGNIFICANT CHANGE UP (ref 150–400)
PO2 BLDV: 38 MMHG — SIGNIFICANT CHANGE UP (ref 25–45)
POTASSIUM BLDV-SCNC: 3.9 MMOL/L — SIGNIFICANT CHANGE UP (ref 3.5–5.1)
POTASSIUM SERPL-MCNC: 4.6 MMOL/L — SIGNIFICANT CHANGE UP (ref 3.5–5.3)
POTASSIUM SERPL-SCNC: 4.6 MMOL/L — SIGNIFICANT CHANGE UP (ref 3.5–5.3)
PROT SERPL-MCNC: 8.1 G/DL — SIGNIFICANT CHANGE UP (ref 6–8.3)
RBC # BLD: 5.52 M/UL — HIGH (ref 3.8–5.2)
RBC # FLD: 13.4 % — SIGNIFICANT CHANGE UP (ref 10.3–14.5)
S AUREUS DNA NOSE QL NAA+PROBE: DETECTED
SAO2 % BLDV: 61.1 % — LOW (ref 67–88)
SODIUM SERPL-SCNC: 128 MMOL/L — LOW (ref 135–145)
WBC # BLD: 7.7 K/UL — SIGNIFICANT CHANGE UP (ref 3.8–10.5)
WBC # FLD AUTO: 7.7 K/UL — SIGNIFICANT CHANGE UP (ref 3.8–10.5)

## 2024-03-12 PROCEDURE — 73090 X-RAY EXAM OF FOREARM: CPT | Mod: 26,LT

## 2024-03-12 PROCEDURE — 99285 EMERGENCY DEPT VISIT HI MDM: CPT

## 2024-03-12 PROCEDURE — 73060 X-RAY EXAM OF HUMERUS: CPT | Mod: 26,LT

## 2024-03-12 PROCEDURE — 73080 X-RAY EXAM OF ELBOW: CPT | Mod: 26,LT

## 2024-03-12 RX ORDER — ACETAMINOPHEN 500 MG
650 TABLET ORAL ONCE
Refills: 0 | Status: COMPLETED | OUTPATIENT
Start: 2024-03-12 | End: 2024-03-12

## 2024-03-12 RX ORDER — VANCOMYCIN HCL 1 G
1000 VIAL (EA) INTRAVENOUS ONCE
Refills: 0 | Status: COMPLETED | OUTPATIENT
Start: 2024-03-12 | End: 2024-03-12

## 2024-03-12 RX ORDER — MUPIROCIN 20 MG/G
1 OINTMENT TOPICAL
Refills: 0 | Status: DISCONTINUED | OUTPATIENT
Start: 2024-03-12 | End: 2024-03-12

## 2024-03-12 RX ORDER — PIPERACILLIN AND TAZOBACTAM 4; .5 G/20ML; G/20ML
3.38 INJECTION, POWDER, LYOPHILIZED, FOR SOLUTION INTRAVENOUS ONCE
Refills: 0 | Status: COMPLETED | OUTPATIENT
Start: 2024-03-12 | End: 2024-03-12

## 2024-03-12 RX ADMIN — Medication 250 MILLIGRAM(S): at 10:54

## 2024-03-12 RX ADMIN — PIPERACILLIN AND TAZOBACTAM 200 GRAM(S): 4; .5 INJECTION, POWDER, LYOPHILIZED, FOR SOLUTION INTRAVENOUS at 09:48

## 2024-03-12 NOTE — DISCHARGE NOTE NURSING/CASE MANAGEMENT/SOCIAL WORK - NSDCPEFALRISK_GEN_ALL_CORE
For information on Fall & Injury Prevention, visit: [email or web address] OR  [email or web address] OR  https://www.cdc.gov/steadi/patient.html

## 2024-03-12 NOTE — ED ADULT TRIAGE NOTE - CHIEF COMPLAINT QUOTE
yes pt c/o left arm pain and swelling X couple of days, pt supposed to go to detox center this week but states "I won't be accepted if I have an infection". pt last used heroin on Saturday 3/9/24. pt has been on methadone X 2 years. past medical history opioid use, bipolar, arthritis pt c/o left arm pain and swelling X couple of days, pt supposed to go to detox center this week but states "I won't be accepted if I have an infection". pt last used heroin on Saturday 3/9/24. pt has been on methadone X 2 years. past medical history opioid use, bipolar, arthritis.    pt refusing EKG in triage.

## 2024-03-12 NOTE — DISCHARGE NOTE PROVIDER - HOSPITAL COURSE
Called by nurse to evaluate patient who wishes to leave the hospital against medical advice. Patient is A&O x3 and has full capacity to make her own medical decisions. Pt was informed of their medical conditions, benefits, and alternatives to treatment as well as the risks of refusing treatment and the seriousness of leaving against medical advice such as the risks of heart attack, stroke, seizure, and even death were fully explained to the patient.  After expressing full understanding, patient then signs out against medical advice witnessed by the nurse.  Attending made aware.

## 2024-03-12 NOTE — ED PROVIDER NOTE - CLINICAL SUMMARY MEDICAL DECISION MAKING FREE TEXT BOX
53-year-old female with history of IV drug use presents with swelling and redness to left arm since 3/9/2024 after heroine injection.  vital signs notable for sinus tachycardia to low 100.   exam well-appearing.  S1-S2 no murmurs.  Ill-defined  blanchable erythema, induration of left antecubital fossa region.  Collection of white pus overlying small affected area, no active drainage. No fluctuance or crepitus.   Presentation consistent with cellulitis, low concern for abscess or nec fasc.   Will obtain labs including blood culture. Given hx of MRSA, will cover w. vanc zosyn. 53-year-old female with history of IV drug use presents with swelling and redness to left arm since 3/9/2024 after heroine injection.  vital signs notable for sinus tachycardia to low 100.   exam well-appearing.  S1-S2 no murmurs.  Ill-defined  blanchable erythema, induration of left antecubital fossa region.  Collection of white pus overlying small affected area, no active drainage. No fluctuance or crepitus.   Presentation consistent with cellulitis, low concern for abscess or nec fasc.   Will obtain labs including blood culture. Given hx of MRSA, will cover w. vanc zosyn.    Attending MD Reid.  Agree with above.  Pt is a 54 yo fem with pmhx of IV drug use presents with LUE swelling and induration with multiple small loculated pockets.  Suspect prob diffuse MRSA.  No palpable crepitus.  Remains nvsc intact distally to site.  Erythema marked with pen and timed/dated. Planned IV abxs and admission.  No current drainable collection 2/2 small size.  Will pursue drainage following abxs admin if isolated collections become apparent/develop.

## 2024-03-12 NOTE — ED PROVIDER NOTE - ATTENDING CONTRIBUTION TO CARE
Attending MD Reid:  I performed a history and physical exam of the patient and discussed their management with the resident. I reviewed the resident's note and agree with the documented findings and plan of care. My medical decision making and observations are found above.

## 2024-03-12 NOTE — ED PROVIDER NOTE - OBJECTIVE STATEMENT
53-year-old female with history of IV drug use presents with swelling and redness to left arm since 3/9/2024 after heroine injection.  Patient states she has been using IV heroin for the last 5 to 6 years, started on methadone 2 years ago.  However she relapsed in July/2023, has been using IV heroin about once a month, most recently last Saturday, 3/9/2024, along with cocaine.  Since Saturday she has noticed redness and swelling to the left antecubital fossa, which has been expanding.  Today she noticed white pus prompting ED visit.  Denies fever/chills, shortness of breath, chest pain, weakness, numbness tingling, nausea or vomiting.   Note history of MRSA infection in July/2023 was treated with IV followed by oral antibiotics.

## 2024-03-12 NOTE — DISCHARGE NOTE PROVIDER - NSDCCPCAREPLAN_GEN_ALL_CORE_FT
PRINCIPAL DISCHARGE DIAGNOSIS  Diagnosis: Cellulitis, diffuse  Assessment and Plan of Treatment: Please follow up with your primary care physician regarding your hospitalization and take all medications prescribed.

## 2024-03-12 NOTE — ED ADULT NURSE NOTE - CHIEF COMPLAINT QUOTE
pt c/o left arm pain and swelling X couple of days, pt supposed to go to detox center this week but states "I won't be accepted if I have an infection". pt last used heroin on Saturday 3/9/24. pt has been on methadone X 2 years. past medical history opioid use, bipolar, arthritis.    pt refusing EKG in triage.

## 2024-03-12 NOTE — ED PROVIDER NOTE - PHYSICAL EXAMINATION
Vital signs reviewed.  CONSTITUTIONAL: NAD, awake  HEAD: Normocephalic; atraumatic  EYES: EOMI, no conjunctival injection, no scleral icterus  MOUTH/THROAT:  MMM  NECK: Trachea midline  CV: Normal S1, S2; no audible murmurs; extremities WWP  RESP: normal work of breathing; CTAB   ABD: soft, non-distended; non-tender  : Deferred  MSK/EXT: no edema  SKIN:   Ill-defined  blanchable erythema, induration of left antecubital fossa region.  Collection of white pus overlying small affected area, no active drainage. No fluctuance or crepitus.   NEURO: Moves all extremities spontaneously with no focal deficits, speech is appropriate

## 2024-03-12 NOTE — ED ADULT NURSE REASSESSMENT NOTE - NS ED NURSE REASSESS COMMENT FT1
pt calm and cooperative awaiting xray results and dispo. Pt has no complaints at this time.
BREAK RN: pt. resting comfortably in stretcher at this time. pt. offers no new complaints at this time. no acute distress noted. respirations even and unlabored. VS as noted.

## 2024-03-12 NOTE — ED ADULT NURSE NOTE - OBJECTIVE STATEMENT
pt awake and alert x 3 arrives with co left upper arm redness and swelling, pt with hx of heroin and cocaine  use last used 3/9.  pt arm whitish colored blister in the center no drainage noted, pt states  has  mild discomfort to site. 3/10.  pt with no fever no co sob or chest pain . iv placed to right a/c blood collected . pt given antibiotics as ordered. pt awaiting dispo.

## 2024-03-12 NOTE — DISCHARGE NOTE NURSING/CASE MANAGEMENT/SOCIAL WORK - PATIENT PORTAL LINK FT
You can access the FollowMyHealth Patient Portal offered by [facility] by registering at the following website: [email or web address]. By joining [facility]’s FollowMyHealth portal, you will also be able to view your health information using other applications (apps) compatible with our system.

## 2024-03-17 LAB
CULTURE RESULTS: SIGNIFICANT CHANGE UP
CULTURE RESULTS: SIGNIFICANT CHANGE UP
SPECIMEN SOURCE: SIGNIFICANT CHANGE UP
SPECIMEN SOURCE: SIGNIFICANT CHANGE UP

## 2024-03-21 ENCOUNTER — EMERGENCY (EMERGENCY)
Facility: HOSPITAL | Age: 54
LOS: 1 days | Discharge: HOME | End: 2024-03-21
Attending: EMERGENCY MEDICINE | Admitting: EMERGENCY MEDICINE
Payer: MEDICARE

## 2024-03-21 VITALS
DIASTOLIC BLOOD PRESSURE: 105 MMHG | RESPIRATION RATE: 18 BRPM | SYSTOLIC BLOOD PRESSURE: 174 MMHG | HEART RATE: 108 BPM | TEMPERATURE: 98 F | OXYGEN SATURATION: 99 %

## 2024-03-21 PROCEDURE — 93010 ELECTROCARDIOGRAM REPORT: CPT

## 2024-03-21 PROCEDURE — 99283 EMERGENCY DEPT VISIT LOW MDM: CPT

## 2024-03-21 NOTE — ED PROVIDER NOTE - NS ED ROS FT
CONSTITUTIONAL: No fevers, no chills, no lightheadedness, no dizziness  MOUTH/THROAT: no sore throat  CV: No chest pain, no palpitations  RESP: No SOB, no cough  GI: No n/v/d, no abd pain  : no dysuria, no hematuria, no flank pain  MSK: no back pain, no extremity pain  SKIN: no rashes, + healing skin wound  NEURO: no headache, no focal weakness, no decreased sensation/parasthesias

## 2024-03-21 NOTE — ED PROVIDER NOTE - OBJECTIVE STATEMENT
53F with hx of IV drug use presents to the ED today for left upper extremity wound check. She was evaluated in the ED 9 days ago for LUE cellulitis after injecting heroin. She did not have it drained at the time but was placed on IV abx and admitted before leaving AMA. She endorses compliance with her PO abx and notes significant improvement in her arm swelling and pain since leaving the hospital. She states she last used heroin prior to last hospital visit. She has not gone her to methadone clinic since discharge. Endorses feeling generally fatigued and weak due to missing her methadone. Otherwise denies fever, chills, chest pain, abdominal pain, or vomiting.

## 2024-03-21 NOTE — ED PROVIDER NOTE - NSFOLLOWUPINSTRUCTIONS_ED_ALL_ED_FT
YOU WERE SEEN IN THE ED FOR: WOUND CHECK     YOUR WOUND APPEARS TO BE HEALING WELL WITHOUT SIGNS OF AN ACTIVE INFECTION AT THIS TIME.     PLEASE KEEP THE AREA CLEAN AND DRY.     YOUR BLOOD PRESSURE WAS ELEVATED IN THE ED. PLEASE FOLLOW UP WITH YOUR PRIMARY CARE DOCTOR TO REFILL YOUR BLOOD PRESSURE MEDICATION AND TAKE AS DIRECTED.     PLEASE GO TO YOUR METHADONE CLINIC TO RECEIVE YOUR METHADONE.     RETURN TO THE EMERGENCY DEPARTMENT IF YOU EXPERIENCE ANY NEW/CONCERNING/WORSENING SYMPTOMS SUCH AS BUT NOT LIMITED TO: INCREASING REDNESS OR SWELLING FROM THE ARM, DRAINAGE, PAIN, FEVER, OR ANY OTHER CONCERNS.

## 2024-03-21 NOTE — ED PROVIDER NOTE - ATTENDING CONTRIBUTION TO CARE
Brief HPI:  53-year-old female past medical history of IV heroin use, in methadone program, recent ED visit 3/12/2024 with admission and subsequent AMA for left upper extremity cellulitis secondary to heroin injection presents for evaluation of left upper extremity wound.  Patient was taking outpatient antibiotics last dose 1 day ago and reports significant improvement in left upper extremity redness, pain, swelling.  Patient states she needs "medical clearance" to resume methadone program.  Denies fever, chills, numbness, tingling, weakness.  Denies recent heroin use.    Vitals:   Reviewed    Exam:    GEN:  Non-toxic appearing, non-distressed, speaking full sentences, non-diaphoretic, AAOx3  HEENT:  NCAT, neck supple, EOMI, PERRLA, sclera anicteric, no conjunctival pallor or injection, no stridor, normal voice, no tonsillar exudate, uvula midline  CV:  regular rhythm and rate, s1/s2 audible, no murmurs, rubs or gallops, peripheral pulses 2+ and symmetric  PULM:  non-labored respirations, lungs clear to auscultation bilaterally, no wheezes, crackles or rales  ABD:  non distended, non-tender, no rebound, no guarding, negative Choi's sign, bowel sounds normal, no cvat  MSK:  no gross deformity, non-tender extremities and joints, range of motion grossly normal appearing, no extremity edema, extremities warm and well perfused   NEURO:  AAOx3, CN II-XII intact, motor 5/5 in upper and lower extremities bilaterally, sensation grossly intact in extremities and trunk, finger to nose testing wnl, no nystagmus, negative Romberg, no pronator drift, no gait deficit  SKIN:  Eschar formation over her left neck to cubital fossa, nontender, areas of induration at edges of eschar, no erythema, no excessive warmth, no fluctuance    A/P:  53-year-old female past medical history of IV heroin use, in methadone program, recent ED visit 3/12/2024 with admission and subsequent AMA for left upper extremity cellulitis secondary to heroin injection presents for evaluation of left upper extremity wound.  Vital signs stable, afebrile.  Left upper extremity does not appear acutely infected.  Patient appears to have responded to outpatient antibiotic therapy.  There are skin changes likely from resolving cellulitis.  Patient has no manifestations of opiate withdrawal at this time.  Will discharge patient.

## 2024-03-21 NOTE — ED PROVIDER NOTE - NS ED MD DISPO DISCHARGE CCDA
CC: no events    TELEMETRY:     PHYSICAL EXAM:    T(C): 36.9 (07-11-21 @ 05:00), Max: 36.9 (07-10-21 @ 08:15)  HR: 103 (07-11-21 @ 05:00) (99 - 111)  BP: 104/67 (07-11-21 @ 05:00) (95/67 - 106/70)  RR: 20 (07-11-21 @ 05:00) (18 - 20)  SpO2: 99% (07-11-21 @ 05:00) (95% - 100%)  Wt(kg): --  I&O's Summary    10 Jul 2021 07:01  -  11 Jul 2021 07:00  --------------------------------------------------------  IN: 120 mL / OUT: 2900 mL / NET: -2780 mL        Appearance: Normal	  Cardiovascular: Normal S1 S2,RRR, No JVD, No murmurs  Respiratory: Lungs clear to auscultation	  Gastrointestinal:  Soft, Non-tender, + BS	  Extremities: Normal range of motion, No clubbing, cyanosis or edema  Vascular: Peripheral pulses palpable 2+ bilaterally     LABS:	 	                          12.3   10.47 )-----------( 249      ( 11 Jul 2021 07:19 )             40.7     07-11    128<L>  |  88<L>  |  52<H>  ----------------------------<  91  3.8   |  22  |  10.42<H>    Ca    9.1      11 Jul 2021 07:15  Phos  5.8     07-11  Mg     2.4     07-11            CARDIAC MARKERS:      MEDICATIONS  (STANDING):  aspirin enteric coated 81 milliGRAM(s) Oral daily  atorvastatin 80 milliGRAM(s) Oral at bedtime  cadexomer iodine 0.9% Gel 1 Application(s) Topical daily  cefepime   IVPB 1000 milliGRAM(s) IV Intermittent every 24 hours  chlorhexidine 0.12% Liquid 15 milliLiter(s) Oral Mucosa two times a day  chlorhexidine 2% Cloths 1 Application(s) Topical <User Schedule>  dextrose 40% Gel 15 Gram(s) Oral once  dextrose 5%. 1000 milliLiter(s) (50 mL/Hr) IV Continuous <Continuous>  dextrose 5%. 1000 milliLiter(s) (100 mL/Hr) IV Continuous <Continuous>  dextrose 50% Injectable 25 Gram(s) IV Push once  dextrose 50% Injectable 12.5 Gram(s) IV Push once  dextrose 50% Injectable 25 Gram(s) IV Push once  ferrous    sulfate 325 milliGRAM(s) Oral three times a day  folic acid 1 milliGRAM(s) Oral daily  glucagon  Injectable 1 milliGRAM(s) IntraMuscular once  heparin   Injectable 5000 Unit(s) SubCutaneous every 12 hours  insulin glargine Injectable (LANTUS) 5 Unit(s) SubCutaneous at bedtime  insulin lispro (ADMELOG) corrective regimen sliding scale   SubCutaneous three times a day before meals  insulin lispro (ADMELOG) corrective regimen sliding scale   SubCutaneous at bedtime  lactated ringers. 1000 milliLiter(s) (50 mL/Hr) IV Continuous <Continuous>  midodrine 30 milliGRAM(s) Oral every 8 hours  pantoprazole    Tablet 40 milliGRAM(s) Oral before breakfast  sevelamer carbonate 800 milliGRAM(s) Oral three times a day  ticagrelor 60 milliGRAM(s) Oral every 12 hours     Patient/Caregiver provided printed discharge information.

## 2024-03-21 NOTE — ED ADULT NURSE NOTE - NSFALLUNIVINTERV_ED_ALL_ED
Bed/Stretcher in lowest position, wheels locked, appropriate side rails in place/Call bell, personal items and telephone in reach/Instruct patient to call for assistance before getting out of bed/chair/stretcher/Non-slip footwear applied when patient is off stretcher/[place] to call system/Physically safe environment - no spills, clutter or unnecessary equipment/Purposeful proactive rounding/Room/bathroom lighting operational, light cord in reach

## 2024-03-21 NOTE — ED ADULT NURSE NOTE - OBJECTIVE STATEMENT
Break coverage RN: Received patient in Intake 10A c/o medical clearance for methadone program. Patient denies any pain/discomfort at this time. Patient is A&OX4, airway patent, breathing unlabored and even. Side rails up and safety maintained.

## 2024-03-21 NOTE — ED PROVIDER NOTE - PATIENT PORTAL LINK FT
You can access the FollowMyHealth Patient Portal offered by [facility] by registering at the following website: [email or web address]. By joining [facility]’s FollowMyHealth portal, you will also be able to view your health information using other applications (apps) compatible with our system.

## 2024-03-21 NOTE — ED PROVIDER NOTE - PHYSICAL EXAMINATION
GEN: NAD, awake, eyes open spontaneously  EYES: normal conjunctiva, perrl  ENT: NCAT, MMM, Trachea midline  CHEST/LUNGS: Non-tachypneic, CTAB, bilateral breath sounds  CARDIAC: Non-tachycardic, normal perfusion  ABDOMEN: Soft, NTND, No rebound/guarding  MSK: No edema, no gross deformity of extremities  SKIN: Track marks along bilateral upper extremities. Left upper extremity with [id number] erythematous scar in antecubital region. No surrounding erythema. No drainage or area of fluctuance.   NEURO: CN grossly intact, normal coordination, no focal motor or sensory deficits  PSYCH: Alert, appropriate, cooperative, with capacity and insight

## 2024-03-21 NOTE — ED PROVIDER NOTE - CLINICAL SUMMARY MEDICAL DECISION MAKING FREE TEXT BOX
53F with hx of IV drug use presents to the ED for left upper extremity wound check. She was evaluated in the ED 9 days ago for UE cellulitis + MRSA and was treated with IV abx, transition to oral when patient left the hospital AMA. No I&D performed at the time because pocket was too small. Wound site appears to be healing well, no open wounds, drainage, crepitus, or surrounding erythema. Afebrile, mildly tachycardic and hypertensive. Patient reports she has a history of HTN however has not taken her medications and cannot recall the name. Advised her to f/u with PMD for prescription refill. No signs of end organ damage. Patient stable for discharge and return precautions given.

## 2024-03-21 NOTE — ED ADULT TRIAGE NOTE - CHIEF COMPLAINT QUOTE
requests a medical clearance to go back to methadone program, reports last took methadone 2 2weeks ago, Phx of opoid abuse, bipolar disorder

## 2024-04-09 ENCOUNTER — INPATIENT (INPATIENT)
Facility: HOSPITAL | Age: 54
LOS: 6 days | Discharge: HOME | End: 2024-04-16
Attending: STUDENT IN AN ORGANIZED HEALTH CARE EDUCATION/TRAINING PROGRAM | Admitting: STUDENT IN AN ORGANIZED HEALTH CARE EDUCATION/TRAINING PROGRAM
Payer: MEDICARE

## 2024-04-09 VITALS
TEMPERATURE: 98 F | DIASTOLIC BLOOD PRESSURE: 103 MMHG | OXYGEN SATURATION: 100 % | SYSTOLIC BLOOD PRESSURE: 150 MMHG | HEART RATE: 103 BPM | RESPIRATION RATE: 18 BRPM

## 2024-04-09 PROCEDURE — 99285 EMERGENCY DEPT VISIT HI MDM: CPT

## 2024-04-09 NOTE — ED ADULT TRIAGE NOTE - CHIEF COMPLAINT QUOTE
c/o fatigue, swelling and rash to extremities x 2 weeks. c/o heavy vaginal bleeding x 1 week and feels worse since. No bleeding at present. Also endorses chest pain. Was admitted for cellulitis in March and AMA'ed. Hx of bipolar, IV drug user last used 2 days

## 2024-04-10 DIAGNOSIS — F11.20 OPIOID DEPENDENCE, UNCOMPLICATED: ICD-10-CM

## 2024-04-10 DIAGNOSIS — E87.1 HYPO-OSMOLALITY AND HYPONATREMIA: ICD-10-CM

## 2024-04-10 DIAGNOSIS — R07.9 CHEST PAIN, UNSPECIFIED: ICD-10-CM

## 2024-04-10 DIAGNOSIS — S82.891A OTHER FRACTURE OF RIGHT LOWER LEG, INITIAL ENCOUNTER FOR CLOSED FRACTURE: Chronic | ICD-10-CM

## 2024-04-10 DIAGNOSIS — Z29.9 ENCOUNTER FOR PROPHYLACTIC MEASURES, UNSPECIFIED: ICD-10-CM

## 2024-04-10 DIAGNOSIS — R45.851 SUICIDAL IDEATIONS: ICD-10-CM

## 2024-04-10 DIAGNOSIS — I24.9 ACUTE ISCHEMIC HEART DISEASE, UNSPECIFIED: ICD-10-CM

## 2024-04-10 LAB
ALBUMIN SERPL ELPH-MCNC: 2.8 G/DL — LOW (ref 3.3–5)
ALBUMIN SERPL ELPH-MCNC: 3.1 G/DL — LOW (ref 3.3–5)
ALP SERPL-CCNC: 118 U/L — SIGNIFICANT CHANGE UP (ref 40–120)
ALP SERPL-CCNC: 142 U/L — HIGH (ref 40–120)
ALT FLD-CCNC: 478 U/L — HIGH (ref 4–33)
ALT FLD-CCNC: SIGNIFICANT CHANGE UP U/L (ref 4–33)
ANION GAP SERPL CALC-SCNC: 11 MMOL/L — SIGNIFICANT CHANGE UP (ref 7–14)
ANION GAP SERPL CALC-SCNC: 14 MMOL/L — SIGNIFICANT CHANGE UP (ref 7–14)
ANION GAP SERPL CALC-SCNC: 9 MMOL/L — SIGNIFICANT CHANGE UP (ref 7–14)
APPEARANCE UR: CLEAR — SIGNIFICANT CHANGE UP
AST SERPL-CCNC: 414 U/L — HIGH (ref 4–32)
AST SERPL-CCNC: SIGNIFICANT CHANGE UP U/L (ref 4–32)
BASOPHILS # BLD AUTO: 0.02 K/UL — SIGNIFICANT CHANGE UP (ref 0–0.2)
BASOPHILS NFR BLD AUTO: 0.2 % — SIGNIFICANT CHANGE UP (ref 0–2)
BILIRUB SERPL-MCNC: 1.2 MG/DL — SIGNIFICANT CHANGE UP (ref 0.2–1.2)
BILIRUB SERPL-MCNC: 1.3 MG/DL — HIGH (ref 0.2–1.2)
BILIRUB UR-MCNC: NEGATIVE — SIGNIFICANT CHANGE UP
BLOOD GAS VENOUS COMPREHENSIVE RESULT: SIGNIFICANT CHANGE UP
BUN SERPL-MCNC: 5 MG/DL — LOW (ref 7–23)
BUN SERPL-MCNC: 6 MG/DL — LOW (ref 7–23)
BUN SERPL-MCNC: 6 MG/DL — LOW (ref 7–23)
CALCIUM SERPL-MCNC: 8.1 MG/DL — LOW (ref 8.4–10.5)
CALCIUM SERPL-MCNC: 8.3 MG/DL — LOW (ref 8.4–10.5)
CALCIUM SERPL-MCNC: 8.7 MG/DL — SIGNIFICANT CHANGE UP (ref 8.4–10.5)
CHLORIDE SERPL-SCNC: 101 MMOL/L — SIGNIFICANT CHANGE UP (ref 98–107)
CHLORIDE SERPL-SCNC: 93 MMOL/L — LOW (ref 98–107)
CHLORIDE SERPL-SCNC: 96 MMOL/L — LOW (ref 98–107)
CO2 SERPL-SCNC: 18 MMOL/L — LOW (ref 22–31)
CO2 SERPL-SCNC: 19 MMOL/L — LOW (ref 22–31)
CO2 SERPL-SCNC: 20 MMOL/L — LOW (ref 22–31)
COLOR SPEC: YELLOW — SIGNIFICANT CHANGE UP
CREAT ?TM UR-MCNC: 40 MG/DL — SIGNIFICANT CHANGE UP
CREAT SERPL-MCNC: 0.48 MG/DL — LOW (ref 0.5–1.3)
CREAT SERPL-MCNC: 0.5 MG/DL — SIGNIFICANT CHANGE UP (ref 0.5–1.3)
CREAT SERPL-MCNC: 0.54 MG/DL — SIGNIFICANT CHANGE UP (ref 0.5–1.3)
DIFF PNL FLD: NEGATIVE — SIGNIFICANT CHANGE UP
EGFR: 111 ML/MIN/1.73M2 — SIGNIFICANT CHANGE UP
EGFR: 113 ML/MIN/1.73M2 — SIGNIFICANT CHANGE UP
EGFR: 115 ML/MIN/1.73M2 — SIGNIFICANT CHANGE UP
EOSINOPHIL # BLD AUTO: 0.08 K/UL — SIGNIFICANT CHANGE UP (ref 0–0.5)
EOSINOPHIL NFR BLD AUTO: 0.9 % — SIGNIFICANT CHANGE UP (ref 0–6)
GLUCOSE SERPL-MCNC: 100 MG/DL — HIGH (ref 70–99)
GLUCOSE SERPL-MCNC: 101 MG/DL — HIGH (ref 70–99)
GLUCOSE SERPL-MCNC: 119 MG/DL — HIGH (ref 70–99)
GLUCOSE UR QL: NEGATIVE MG/DL — SIGNIFICANT CHANGE UP
HCG SERPL-ACNC: <1 MIU/ML — SIGNIFICANT CHANGE UP
HCT VFR BLD CALC: 37.4 % — SIGNIFICANT CHANGE UP (ref 34.5–45)
HCT VFR BLD CALC: 39.7 % — SIGNIFICANT CHANGE UP (ref 34.5–45)
HGB BLD-MCNC: 12 G/DL — SIGNIFICANT CHANGE UP (ref 11.5–15.5)
HGB BLD-MCNC: 13.2 G/DL — SIGNIFICANT CHANGE UP (ref 11.5–15.5)
IANC: 7.14 K/UL — SIGNIFICANT CHANGE UP (ref 1.8–7.4)
IMM GRANULOCYTES NFR BLD AUTO: 0.4 % — SIGNIFICANT CHANGE UP (ref 0–0.9)
KETONES UR-MCNC: NEGATIVE MG/DL — SIGNIFICANT CHANGE UP
LEUKOCYTE ESTERASE UR-ACNC: NEGATIVE — SIGNIFICANT CHANGE UP
LYMPHOCYTES # BLD AUTO: 1.23 K/UL — SIGNIFICANT CHANGE UP (ref 1–3.3)
LYMPHOCYTES # BLD AUTO: 13.3 % — SIGNIFICANT CHANGE UP (ref 13–44)
MAGNESIUM SERPL-MCNC: 2 MG/DL — SIGNIFICANT CHANGE UP (ref 1.6–2.6)
MCHC RBC-ENTMCNC: 25.2 PG — LOW (ref 27–34)
MCHC RBC-ENTMCNC: 25.7 PG — LOW (ref 27–34)
MCHC RBC-ENTMCNC: 32.1 GM/DL — SIGNIFICANT CHANGE UP (ref 32–36)
MCHC RBC-ENTMCNC: 33.2 GM/DL — SIGNIFICANT CHANGE UP (ref 32–36)
MCV RBC AUTO: 77.4 FL — LOW (ref 80–100)
MCV RBC AUTO: 78.4 FL — LOW (ref 80–100)
MONOCYTES # BLD AUTO: 0.74 K/UL — SIGNIFICANT CHANGE UP (ref 0–0.9)
MONOCYTES NFR BLD AUTO: 8 % — SIGNIFICANT CHANGE UP (ref 2–14)
NEUTROPHILS # BLD AUTO: 7.14 K/UL — SIGNIFICANT CHANGE UP (ref 1.8–7.4)
NEUTROPHILS NFR BLD AUTO: 77.2 % — HIGH (ref 43–77)
NITRITE UR-MCNC: NEGATIVE — SIGNIFICANT CHANGE UP
NRBC # BLD: 0 /100 WBCS — SIGNIFICANT CHANGE UP (ref 0–0)
NRBC # BLD: 0 /100 WBCS — SIGNIFICANT CHANGE UP (ref 0–0)
NRBC # FLD: 0 K/UL — SIGNIFICANT CHANGE UP (ref 0–0)
NRBC # FLD: 0 K/UL — SIGNIFICANT CHANGE UP (ref 0–0)
OSMOLALITY UR: 215 MOSM/KG — SIGNIFICANT CHANGE UP (ref 50–1200)
PH UR: 6.5 — SIGNIFICANT CHANGE UP (ref 5–8)
PHOSPHATE SERPL-MCNC: 2.8 MG/DL — SIGNIFICANT CHANGE UP (ref 2.5–4.5)
PLATELET # BLD AUTO: 180 K/UL — SIGNIFICANT CHANGE UP (ref 150–400)
PLATELET # BLD AUTO: 253 K/UL — SIGNIFICANT CHANGE UP (ref 150–400)
POTASSIUM SERPL-MCNC: 3.9 MMOL/L — SIGNIFICANT CHANGE UP (ref 3.5–5.3)
POTASSIUM SERPL-MCNC: 5.6 MMOL/L — HIGH (ref 3.5–5.3)
POTASSIUM SERPL-MCNC: SIGNIFICANT CHANGE UP MMOL/L (ref 3.5–5.3)
POTASSIUM SERPL-SCNC: 3.9 MMOL/L — SIGNIFICANT CHANGE UP (ref 3.5–5.3)
POTASSIUM SERPL-SCNC: 5.6 MMOL/L — HIGH (ref 3.5–5.3)
POTASSIUM SERPL-SCNC: SIGNIFICANT CHANGE UP MMOL/L (ref 3.5–5.3)
POTASSIUM UR-SCNC: 17.2 MMOL/L — SIGNIFICANT CHANGE UP
PROT ?TM UR-MCNC: 7 MG/DL — SIGNIFICANT CHANGE UP
PROT SERPL-MCNC: 8 G/DL — SIGNIFICANT CHANGE UP (ref 6–8.3)
PROT SERPL-MCNC: SIGNIFICANT CHANGE UP G/DL (ref 6–8.3)
PROT UR-MCNC: NEGATIVE MG/DL — SIGNIFICANT CHANGE UP
PROT/CREAT UR-RTO: 0.2 RATIO — SIGNIFICANT CHANGE UP (ref 0–0.2)
RBC # BLD: 4.77 M/UL — SIGNIFICANT CHANGE UP (ref 3.8–5.2)
RBC # BLD: 5.13 M/UL — SIGNIFICANT CHANGE UP (ref 3.8–5.2)
RBC # FLD: 15.6 % — HIGH (ref 10.3–14.5)
RBC # FLD: 16.1 % — HIGH (ref 10.3–14.5)
SODIUM SERPL-SCNC: 125 MMOL/L — LOW (ref 135–145)
SODIUM SERPL-SCNC: 126 MMOL/L — LOW (ref 135–145)
SODIUM SERPL-SCNC: 130 MMOL/L — LOW (ref 135–145)
SODIUM UR-SCNC: 28 MMOL/L — SIGNIFICANT CHANGE UP
SP GR SPEC: 1.01 — SIGNIFICANT CHANGE UP (ref 1–1.03)
TROPONIN T, HIGH SENSITIVITY RESULT: <6 NG/L — SIGNIFICANT CHANGE UP
TROPONIN T, HIGH SENSITIVITY RESULT: <6 NG/L — SIGNIFICANT CHANGE UP
UROBILINOGEN FLD QL: 1 MG/DL — SIGNIFICANT CHANGE UP (ref 0.2–1)
UUN UR-MCNC: 234.7 MG/DL — SIGNIFICANT CHANGE UP
WBC # BLD: 8.59 K/UL — SIGNIFICANT CHANGE UP (ref 3.8–10.5)
WBC # BLD: 9.25 K/UL — SIGNIFICANT CHANGE UP (ref 3.8–10.5)
WBC # FLD AUTO: 8.59 K/UL — SIGNIFICANT CHANGE UP (ref 3.8–10.5)
WBC # FLD AUTO: 9.25 K/UL — SIGNIFICANT CHANGE UP (ref 3.8–10.5)

## 2024-04-10 PROCEDURE — 99223 1ST HOSP IP/OBS HIGH 75: CPT

## 2024-04-10 PROCEDURE — 71046 X-RAY EXAM CHEST 2 VIEWS: CPT | Mod: 26

## 2024-04-10 RX ORDER — HALOPERIDOL DECANOATE 100 MG/ML
5 INJECTION INTRAMUSCULAR ONCE
Refills: 0 | Status: COMPLETED | OUTPATIENT
Start: 2024-04-10 | End: 2024-04-10

## 2024-04-10 RX ORDER — ONDANSETRON 8 MG/1
4 TABLET, FILM COATED ORAL EVERY 8 HOURS
Refills: 0 | Status: DISCONTINUED | OUTPATIENT
Start: 2024-04-10 | End: 2024-04-16

## 2024-04-10 RX ORDER — VANCOMYCIN HCL 1 G
1000 VIAL (EA) INTRAVENOUS ONCE
Refills: 0 | Status: COMPLETED | OUTPATIENT
Start: 2024-04-10 | End: 2024-04-10

## 2024-04-10 RX ORDER — HALOPERIDOL DECANOATE 100 MG/ML
5 INJECTION INTRAMUSCULAR EVERY 8 HOURS
Refills: 0 | Status: DISCONTINUED | OUTPATIENT
Start: 2024-04-10 | End: 2024-04-16

## 2024-04-10 RX ORDER — NICOTINE POLACRILEX 2 MG
1 GUM BUCCAL DAILY
Refills: 0 | Status: DISCONTINUED | OUTPATIENT
Start: 2024-04-10 | End: 2024-04-16

## 2024-04-10 RX ORDER — SODIUM CHLORIDE 9 MG/ML
1000 INJECTION INTRAMUSCULAR; INTRAVENOUS; SUBCUTANEOUS ONCE
Refills: 0 | Status: COMPLETED | OUTPATIENT
Start: 2024-04-10 | End: 2024-04-10

## 2024-04-10 RX ORDER — CEFEPIME 1 G/1
2000 INJECTION, POWDER, FOR SOLUTION INTRAMUSCULAR; INTRAVENOUS EVERY 8 HOURS
Refills: 0 | Status: DISCONTINUED | OUTPATIENT
Start: 2024-04-10 | End: 2024-04-10

## 2024-04-10 RX ORDER — METHADONE HYDROCHLORIDE 40 MG/1
10 TABLET ORAL DAILY
Refills: 0 | Status: DISCONTINUED | OUTPATIENT
Start: 2024-04-10 | End: 2024-04-15

## 2024-04-10 RX ORDER — CEFEPIME 1 G/1
2000 INJECTION, POWDER, FOR SOLUTION INTRAMUSCULAR; INTRAVENOUS ONCE
Refills: 0 | Status: COMPLETED | OUTPATIENT
Start: 2024-04-10 | End: 2024-04-10

## 2024-04-10 RX ORDER — VANCOMYCIN HCL 1 G
1000 VIAL (EA) INTRAVENOUS EVERY 12 HOURS
Refills: 0 | Status: DISCONTINUED | OUTPATIENT
Start: 2024-04-10 | End: 2024-04-13

## 2024-04-10 RX ORDER — SODIUM CHLORIDE 9 MG/ML
1000 INJECTION INTRAMUSCULAR; INTRAVENOUS; SUBCUTANEOUS
Refills: 0 | Status: DISCONTINUED | OUTPATIENT
Start: 2024-04-10 | End: 2024-04-14

## 2024-04-10 RX ORDER — LANOLIN ALCOHOL/MO/W.PET/CERES
3 CREAM (GRAM) TOPICAL AT BEDTIME
Refills: 0 | Status: DISCONTINUED | OUTPATIENT
Start: 2024-04-10 | End: 2024-04-16

## 2024-04-10 RX ORDER — CEFEPIME 1 G/1
2000 INJECTION, POWDER, FOR SOLUTION INTRAMUSCULAR; INTRAVENOUS EVERY 8 HOURS
Refills: 0 | Status: DISCONTINUED | OUTPATIENT
Start: 2024-04-10 | End: 2024-04-14

## 2024-04-10 RX ORDER — SODIUM ZIRCONIUM CYCLOSILICATE 10 G/10G
10 POWDER, FOR SUSPENSION ORAL ONCE
Refills: 0 | Status: COMPLETED | OUTPATIENT
Start: 2024-04-10 | End: 2024-04-10

## 2024-04-10 RX ORDER — VANCOMYCIN HCL 1 G
1000 VIAL (EA) INTRAVENOUS EVERY 8 HOURS
Refills: 0 | Status: DISCONTINUED | OUTPATIENT
Start: 2024-04-10 | End: 2024-04-10

## 2024-04-10 RX ORDER — ACETAMINOPHEN 500 MG
650 TABLET ORAL EVERY 6 HOURS
Refills: 0 | Status: DISCONTINUED | OUTPATIENT
Start: 2024-04-10 | End: 2024-04-16

## 2024-04-10 RX ORDER — ASPIRIN/CALCIUM CARB/MAGNESIUM 324 MG
162 TABLET ORAL ONCE
Refills: 0 | Status: COMPLETED | OUTPATIENT
Start: 2024-04-10 | End: 2024-04-10

## 2024-04-10 RX ADMIN — METHADONE HYDROCHLORIDE 10 MILLIGRAM(S): 40 TABLET ORAL at 19:10

## 2024-04-10 RX ADMIN — CEFEPIME 100 MILLIGRAM(S): 1 INJECTION, POWDER, FOR SOLUTION INTRAMUSCULAR; INTRAVENOUS at 02:33

## 2024-04-10 RX ADMIN — Medication 162 MILLIGRAM(S): at 02:33

## 2024-04-10 RX ADMIN — CEFEPIME 2000 MILLIGRAM(S): 1 INJECTION, POWDER, FOR SOLUTION INTRAMUSCULAR; INTRAVENOUS at 22:44

## 2024-04-10 RX ADMIN — SODIUM ZIRCONIUM CYCLOSILICATE 10 GRAM(S): 10 POWDER, FOR SUSPENSION ORAL at 04:00

## 2024-04-10 RX ADMIN — HALOPERIDOL DECANOATE 5 MILLIGRAM(S): 100 INJECTION INTRAMUSCULAR at 12:15

## 2024-04-10 RX ADMIN — Medication 250 MILLIGRAM(S): at 04:00

## 2024-04-10 RX ADMIN — SODIUM CHLORIDE 1000 MILLILITER(S): 9 INJECTION INTRAMUSCULAR; INTRAVENOUS; SUBCUTANEOUS at 01:15

## 2024-04-10 RX ADMIN — SODIUM CHLORIDE 75 MILLILITER(S): 9 INJECTION INTRAMUSCULAR; INTRAVENOUS; SUBCUTANEOUS at 04:00

## 2024-04-10 NOTE — H&P ADULT - PROBLEM SELECTOR PLAN 5
DVT ppx: SCDs  Diet Regular fluid restrict  Dispo pending cardiac/ infectious work up  Corrected calcium 9.3  SW consulted for addiction resources

## 2024-04-10 NOTE — ED ADULT NURSE REASSESSMENT NOTE - NS ED NURSE REASSESS COMMENT FT1
Pt laying in bed, NAD, respirations even and unlabored, VS in flow sheet. Report given to Julianna BRIZUELA ESS[facility] 4, awaiting transport by ED tech.

## 2024-04-10 NOTE — ED PROVIDER NOTE - CARE PLAN
Principal Discharge DX:	Chest pain  Secondary Diagnosis:	Shortness of breath  Secondary Diagnosis:	Chills   1

## 2024-04-10 NOTE — ED ADULT NURSE NOTE - DRUG PRE-SCREENING (DAST -1)
Statement Selected Initiate Treatment: Moisturizer daily to toes Plan: Does not look classic for tinea pedis or eczema.  No obvious rash currently. Otc Regimen: OTC Cortisone as needed when flaring through moisturizing and anti-itch Sarna lotion Detail Level: Zone

## 2024-04-10 NOTE — H&P ADULT - PROBLEM SELECTOR PLAN 2
- Last use yesterday. Was receiving methadone form [facility] but unable to reach. Will start with methadone 10 mg qd  - Psych consulted for up titration - Per ACP pt declining TTE, requesting needle to end her life claiming she has DNR DNI at home  - Placed on 1 to 1  - Psych made aware

## 2024-04-10 NOTE — H&P ADULT - PROBLEM/PLAN-5
Push fluids  Rest  Tylenol OTC    Gastroenteritis  - Plenty of fluids orally: soups, Gatorade  - Begin BRAT (banana, rice, apple, tea, toast) diet for the first 48 hours   - Advance diet slowly  - Avoid dairy products until diarrhea is resolved  - Wash hands thoroughly     Follow-up PMD or here in 2d if not improving or if symptoms change    Go to the ER if short of breath, chest pain, difficulty swallowing, high fever, lethargic, dehydrated, acutely worse   
DISPLAY PLAN FREE TEXT

## 2024-04-10 NOTE — BH CONSULTATION LIAISON ASSESSMENT NOTE - HPI (INCLUDE ILLNESS QUALITY, SEVERITY, DURATION, TIMING, CONTEXT, MODIFYING FACTORS, ASSOCIATED SIGNS AND SYMPTOMS)
52 y/o F with self reported h/o Bipolar disorder and with PMH of IV drug use (heroin/ cocaine?) and asthma presents with chest pain, fevers, chills body aches for the past few days. She was getting methadone from the [facility] methadone clinic but stopped 1 week ago and has been using Heroin. Pt was seen on March 12th for cellulitis and was discharged with antibiotics. Pt admitted for ACS rule out and endocarditis work up.      psychiatry called for SI as the patient made a statement requesting a needle to end her life claiming she has DNR DNI at home    Patient seen in the ED, on 1:1. Evasive on exam, guarded. Received Haldol PRN earlier for agitation. Awake on exam but as she saw the writer, she closed her eyes, pretending to be asleep. Said that she came here for chest pain and now wants to go home. Said that she lives by herself. Discussed that she is currently being worked up for chest pain and needs further tests but she kept perseverating that she needs to go home, saying that if she experiences chest pain again she will take an Aspirin. Reported that she has h/o Bipolar but wouldn't go into details, unclear if she takes any medications at this time. Denied AH, VH, paranoia. Also denied HI/I/P. When asked about SI, she took a pause and then said "no". Denied using any illicit substances lately.

## 2024-04-10 NOTE — ED ADULT NURSE NOTE - OBJECTIVE STATEMENT
Patient received in intake 12, A&Ox3 ambulatory at baseline pmh: Patient received in intake 12, A&Ox3 ambulatory at baseline pmh: IV drug use (heroin, last used yesterday), asthma, daily smoker, bipolar disorder, recent admission for UE cellulitis +MRSA, c/o CP, SOB, malaise, myalgias, chills x2 days. Endorses rash to extremities. Denies recent travel, sick contacts, n/v/d, fever, abdominal pain, visual changes, urinary symptoms. Breathing even and unlabored.  Left 22g placed, labs drawn and sent.

## 2024-04-10 NOTE — BH CONSULTATION LIAISON ASSESSMENT NOTE - CURRENT MEDICATION
MEDICATIONS  (STANDING):  cefepime   IVPB 2000 milliGRAM(s) IV Intermittent every 8 hours  methadone    Tablet 10 milliGRAM(s) Oral daily  nicotine - 21 mG/24Hr(s) Patch 1 Patch Transdermal daily  sodium chloride 0.9%. 1000 milliLiter(s) (75 mL/Hr) IV Continuous <Continuous>  vancomycin  IVPB 1000 milliGRAM(s) IV Intermittent every 12 hours    MEDICATIONS  (PRN):  acetaminophen     Tablet .. 650 milliGRAM(s) Oral every 6 hours PRN Temp greater or equal to 38C (100.4F), Mild Pain (1 - 3)  aluminum hydroxide/magnesium hydroxide/simethicone Suspension 30 milliLiter(s) Oral every 4 hours PRN Dyspepsia  melatonin 3 milliGRAM(s) Oral at bedtime PRN Insomnia  ondansetron Injectable 4 milliGRAM(s) IV Push every 8 hours PRN Nausea and/or Vomiting

## 2024-04-10 NOTE — ED PROVIDER NOTE - ENMT, MLM
Airway patent, Nasal mucosa clear. Mouth with normal mucosa. Throat has no vesicles, no oropharyngeal exudates and uvula is midline. Dentition is in poor repair, no trismus, no extraoral swelling.

## 2024-04-10 NOTE — H&P ADULT - NSHPPHYSICALEXAM_GEN_ALL_CORE
Vital Signs Last 24 Hrs  T(C): 36.6 (10 Apr 2024 06:55), Max: 36.9 (10 Apr 2024 05:35)  T(F): 97.9 (10 Apr 2024 06:55), Max: 98.4 (10 Apr 2024 05:35)  HR: 85 (10 Apr 2024 06:55) (85 - 103)  BP: 150/86 (10 Apr 2024 06:55) (148/93 - 150/103)  BP(mean): --  RR: 19 (10 Apr 2024 06:55) (18 - 19)  SpO2: 99% (10 Apr 2024 06:55) (99% - 100%)    Parameters below as of 10 Apr 2024 06:55  Patient On (Oxygen Delivery Method): room air        CONSTITUTIONAL: Sleeping in stretcher   EYES: No conjunctival or scleral injection  ENMT: No external nasal lesions  NECK: Trachea midline without palpable neck mass; thyroid not enlarged and non-tender  RESPIRATORY: Breathing comfortably; lungs CTA without wheeze/rhonchi/rales  CARDIOVASCULAR: +S1S2, RRR,  no lower extremity edema  GASTROINTESTINAL: No tenderness, +BS throughout  SKIN: Several injection marks throughout b/l upper arms and dorsum of hands though none appear erythematous or tender  NEUROLOGIC: sensation intact in LEs b/l to light touch  PSYCHIATRIC: A+O x 3

## 2024-04-10 NOTE — BH CONSULTATION LIAISON ASSESSMENT NOTE - SUMMARY
52 y/o F with self reported h/o Bipolar disorder and with PMH of IV drug use (heroin/ cocaine?) and asthma presents with chest pain, fevers, chills body aches for the past few days. She was getting methadone from the [facility] methadone clinic but stopped 1 week ago and has been using Heroin. Pt was seen on March 12th for cellulitis and was discharged with antibiotics. Pt admitted for ACS rule out and endocarditis work up.   CL psychiatry called for SI as the patient made a statement requesting a needle to end her life claiming she has DNR DNI at home  Patient seen in the ED, on 1:1. Evasive on exam, guarded. Received Haldol PRN earlier for agitation, perseverates on discharge, poor insight, not much forthcoming with the evaluation.     Plan:  CW 1:1 observation for risk of elopement  Obtain Utox  SBIRT consult for substance use  Continue home meds  PRN- Haldol 5 mg IV/IM Q8, Ativan 2 mg IV/IM Q8 for agitation if QTc <500  COWS monitoring for opiate use d/o  Continue rest of the medical workup  CL will follow, patient can not leave AMA 52 y/o F with self reported h/o Bipolar disorder and with PMH of IV drug use (heroin/ cocaine?) and asthma presents with chest pain, fevers, chills body aches for the past few days. She was getting methadone from the [facility] methadone clinic but stopped 1 week ago and has been using Heroin. Pt was seen on March 12th for cellulitis and was discharged with antibiotics. Pt admitted for ACS rule out and endocarditis work up.   CL psychiatry called for SI as the patient made a statement requesting a needle to end her life claiming she has DNR DNI at home  Patient seen in the ED, on 1:1. Evasive on exam, guarded. Received Haldol PRN earlier for agitation, perseverates on discharge, poor insight, not much forthcoming with the evaluation.     Plan:  CW 1:1 observation for risk of elopement  Obtain Utox  SBIRT consult for substance use  Continue home meds  PRN- Haldol 5 mg IV/IM Q8, Ativan 2 mg IV/IM Q8 for agitation if QTc <500  COWS monitoring for opiate use d/o (consider Methadone taper of 30mg day 1, then 20mg day 2, then 10mg day 3, then discontinue)  Continue rest of the medical workup  CL will follow, patient can not leave AMA; dispo pending

## 2024-04-10 NOTE — CONSULT NOTE ADULT - ASSESSMENT
54 y/o F with PMH of IV drug use (heroin/ cocaine?), bipolar disorder and asthma presents with chest pain, fevers, chills body aches for the past few days.  Troponin is negative and EKG is non ischemic.     #Chest pain  Pain is more likely MSK/myalgias from history. Pain could also be due to her drug use, specifically cocaine but there is no elevation of trop or EKG changes to suggest spasm or ischemia at this time.   -Please obtain HgbA1c, lipid panel, TSH  -Please obtain TTE  -Can stop trending troponin as it was negative x2    Davin Breen MD  Cardiology fellow

## 2024-04-10 NOTE — CONSULT NOTE ADULT - ATTENDING COMMENTS
personally saw and examined pt  labs and vitals reviewed  agree with above assessment and plan  pt endorsing diffuse pain, chest, back extremities, also with cp  pt states cp constant, not worse with exertion  also states ca over past few days  admits to recent cocaine and heroin use 2-3 days ago  ekg without acute ischemic changes  trop negative x2, can trend   consider pysch eval for detox  cont tele  repeat ekg if CP worsens

## 2024-04-10 NOTE — H&P ADULT - PROBLEM SELECTOR PLAN 1
- r/o in setting of IV drug use and recent cellulitis from injection site LUE   - Continue with Cefepime 2g a8h and Vancomycin + MRSA positive on CPR  - BC and UC pending    - TTE pending - r/o vegetation from endocarditis  - EKG showing NSR HR 92 QTc 432  - Troponin negative x2   - Admit to tele  -  x1 in ED  - Cardiology consulted pending recs

## 2024-04-10 NOTE — CONSULT NOTE ADULT - SUBJECTIVE AND OBJECTIVE BOX
Patient seen and evaluated at bedside    Chief Complaint: Chest pain/body aches    HPI:  52 y/o F with PMH of IV drug use (heroin/ cocaine?), bipolar disorder and asthma presents with chest pain, fevers, chills body aches for the past few days. She reports the chest pain is constant. She was getting methadone from the [facility] methadone clinic but stopped 1 week ago and has been using heroin. Pt was seen on March 12th for cellulitis of injection site at left AC fossa and was discharged with antibiotics which she reports taking. Pt denies vomiting, nausea, diarrhea, joint back or pain.  (10 Apr 2024 10:32)      PMHx:   RA (rheumatoid arthritis)    Opioid use disorder, moderate, in early remission, on maintenance therapy, dependence    Bipolar disorder    Opioid use disorder        PSHx:   No significant past surgical history    Ankle fracture, right        Allergies:  No Known Allergies      Home Meds:  RA (rheumatoid arthritis)    Opioid use disorder, moderate, in early remission, on maintenance therapy, dependence    Bipolar disorder    Opioid use disorder        Current Medications:   acetaminophen     Tablet .. 650 milliGRAM(s) Oral every 6 hours PRN  aluminum hydroxide/magnesium hydroxide/simethicone Suspension 30 milliLiter(s) Oral every 4 hours PRN  cefepime   IVPB 2000 milliGRAM(s) IV Intermittent every 8 hours  melatonin 3 milliGRAM(s) Oral at bedtime PRN  methadone    Tablet 10 milliGRAM(s) Oral daily  nicotine - 21 mG/24Hr(s) Patch 1 Patch Transdermal daily  ondansetron Injectable 4 milliGRAM(s) IV Push every 8 hours PRN  sodium chloride 0.9%. 1000 milliLiter(s) IV Continuous <Continuous>  vancomycin  IVPB. 1000 milliGRAM(s) IV Intermittent every 8 hours      FAMILY HISTORY:  No pertinent family history in first degree relatives        Social History:  Heroin and cocaine use    REVIEW OF SYSTEMS:  Constitutional:     [ ] negative [ ] fevers [ ] chills [ ] weight loss [ ] weight gain  HEENT:                  [ ] negative [ ] dry eyes [ ] eye irritation [ ] postnasal drip [ ] nasal congestion  CV:                         [ ] negative  [ ] chest pain [ ] orthopnea [ ] palpitations [ ] murmur  Resp:                     [ ] negative [ ] cough [ ] shortness of breath [ ] dyspnea [ ] wheezing [ ] sputum [ ]hemoptysis  GI:                          [ ] negative [ ] nausea [ ] vomiting [ ] diarrhea [ ] constipation [ ] abd pain [ ] dysphagia   :                        [ ] negative [ ] dysuria [ ] nocturia [ ] hematuria [ ] increased urinary frequency  Musculoskeletal: [ ] negative [ ] back pain [ ] myalgias [ ] arthralgias [ ] fracture  Skin:                       [ ] negative [ ] rash [ ] itch  Neurological:        [ ] negative [ ] headache [ ] dizziness [ ] syncope [ ] weakness [ ] numbness  Psychiatric:           [ ] negative [ ] anxiety [ ] depression  Endocrine:            [ ] negative [ ] diabetes [ ] thyroid problem  Heme/Lymph:      [ ] negative [ ] anemia [ ] bleeding problem  Allergic/Immune: [ ] negative [ ] itchy eyes [ ] nasal discharge [ ] hives [ ] angioedema    [ ] All other systems negative  [ ] Unable to assess ROS due to      Physical Exam:  T(F): 97.9 (04-10), Max: 98.4 (04-10)  HR: 85 (04-10) (85 - 103)  BP: 150/86 (04-10) (148/93 - 150/103)  RR: 19 (04-10)  SpO2: 99% (04-10)  GENERAL: No acute distress, well-developed  HEAD:  Atraumatic, Normocephalic  ENT: EOMI, PERRLA, conjunctiva and sclera clear, Neck supple, No JVD, moist mucosa  CHEST/LUNG: Clear to auscultation bilaterally; No wheeze, equal breath sounds bilaterally   BACK: No spinal tenderness  HEART: Regular rate and rhythm; No murmurs, rubs, or gallops  ABDOMEN: Soft, Nontender, Nondistended; Bowel sounds present  EXTREMITIES:  No clubbing, cyanosis, or edema  PSYCH: Nl behavior, nl affect  NEUROLOGY: AAOx3, non-focal, cranial nerves intact  SKIN: Normal color, No rashes or lesions  LINES:    Cardiovascular Diagnostic Testing:    ECG: Personally reviewed:    Echo: Personally reviewed:    Stress Testing:    Cath:    Imaging:    CXR: Personally reviewed    Labs: Personally reviewed                        12.0   8.59  )-----------( 180      ( 10 Apr 2024 06:00 )             37.4     04-10    130<L>  |  101  |  5<L>  ----------------------------<  119<H>  3.9   |  20<L>  |  0.50    Ca    8.3<L>      10 Apr 2024 06:00  Phos  2.8     04-10  Mg     2.00     04-10    TPro  TNP  /  Alb  2.8<L>  /  TBili  1.3<H>  /  DBili  x   /  AST  TNP  /  ALT  TNP  /  AlkPhos  118  04-10                CARDIAC MARKERS ( 10 Apr 2024 02:35 )  <6 ng/L / x     / x     / x     / x     / x      CARDIAC MARKERS ( 10 Apr 2024 01:10 )  <6 ng/L / x     / x     / x     / x     / x          No Known Allergies    acetaminophen     Tablet .. 650 milliGRAM(s) Oral every 6 hours PRN  aluminum hydroxide/magnesium hydroxide/simethicone Suspension 30 milliLiter(s) Oral every 4 hours PRN  cefepime   IVPB 2000 milliGRAM(s) IV Intermittent every 8 hours  melatonin 3 milliGRAM(s) Oral at bedtime PRN  methadone    Tablet 10 milliGRAM(s) Oral daily  nicotine - 21 mG/24Hr(s) Patch 1 Patch Transdermal daily  ondansetron Injectable 4 milliGRAM(s) IV Push every 8 hours PRN  sodium chloride 0.9%. 1000 milliLiter(s) IV Continuous <Continuous>  vancomycin  IVPB. 1000 milliGRAM(s) IV Intermittent every 8 hours    T(F): 97.9 (04-10), Max: 98.4 (04-10)  HR: 85 (04-10) (85 - 103)  BP: 150/86 (04-10) (148/93 - 150/103)  RR: 19 (04-10)  SpO2: 99% (04-10) Patient seen and evaluated at bedside    Chief Complaint: Chest pain/body aches    HPI:  54 y/o F with PMH of IV drug use (heroin/ cocaine?), bipolar disorder and asthma presents with chest pain, fevers, chills body aches for the past few days. She reports the chest pain is constant and has been occurring for two weeks without relief. She reports that the chest pain worsens with deep breaths. She also has total body pain as well. She was getting methadone from the [facility] but stopped 1 week ago and has been using heroin. Pt was seen on March 12th for cellulitis of injection site at left AC fossa and was discharged with antibiotics which she reports taking. Pt denies vomiting, nausea, diarrhea, joint back or pain.       PMHx:   RA (rheumatoid arthritis)    Opioid use disorder, moderate, in early remission, on maintenance therapy, dependence    Bipolar disorder    Opioid use disorder        PSHx:   No significant past surgical history    Ankle fracture, right        Allergies:  No Known Allergies      Home Meds:  Methadone  WEllbutrin  Seroquel        Current Medications:   acetaminophen     Tablet .. 650 milliGRAM(s) Oral every 6 hours PRN  aluminum hydroxide/magnesium hydroxide/simethicone Suspension 30 milliLiter(s) Oral every 4 hours PRN  cefepime   IVPB 2000 milliGRAM(s) IV Intermittent every 8 hours  melatonin 3 milliGRAM(s) Oral at bedtime PRN  methadone    Tablet 10 milliGRAM(s) Oral daily  nicotine - 21 mG/24Hr(s) Patch 1 Patch Transdermal daily  ondansetron Injectable 4 milliGRAM(s) IV Push every 8 hours PRN  sodium chloride 0.9%. 1000 milliLiter(s) IV Continuous <Continuous>  vancomycin  IVPB. 1000 milliGRAM(s) IV Intermittent every 8 hours      FAMILY HISTORY:  No pertinent family history in first degree relatives        Social History:  Heroin and cocaine use, daily smoker (1ppd) since she was 15 yo    REVIEW OF SYSTEMS:  Constitutional:     [ ] negative [ ] fevers [ ] chills [ ] weight loss [ ] weight gain  HEENT:                  [ ] negative [ ] dry eyes [ ] eye irritation [ ] postnasal drip [ ] nasal congestion  CV:                         [ ] negative  [ ] chest pain [ ] orthopnea [ ] palpitations [ ] murmur  Resp:                     [ ] negative [ ] cough [ ] shortness of breath [ ] dyspnea [ ] wheezing [ ] sputum [ ]hemoptysis  GI:                          [ ] negative [ ] nausea [ ] vomiting [ ] diarrhea [ ] constipation [ ] abd pain [ ] dysphagia   :                        [ ] negative [ ] dysuria [ ] nocturia [ ] hematuria [ ] increased urinary frequency  Musculoskeletal: [ ] negative [ ] back pain [ ] myalgias [ ] arthralgias [ ] fracture  Skin:                       [ ] negative [ ] rash [ ] itch  Neurological:        [ ] negative [ ] headache [ ] dizziness [ ] syncope [ ] weakness [ ] numbness  Psychiatric:           [ ] negative [ ] anxiety [ ] depression  Endocrine:            [ ] negative [ ] diabetes [ ] thyroid problem  Heme/Lymph:      [ ] negative [ ] anemia [ ] bleeding problem  Allergic/Immune: [ ] negative [ ] itchy eyes [ ] nasal discharge [ ] hives [ ] angioedema    [ ] All other systems negative  [ ] Unable to assess ROS due to      Physical Exam:  T(F): 97.9 (04-10), Max: 98.4 (04-10)  HR: 85 (04-10) (85 - 103)  BP: 150/86 (04-10) (148/93 - 150/103)  RR: 19 (04-10)  SpO2: 99% (04-10)  GENERAL: No acute distress, well-developed  HEAD:  Atraumatic, Normocephalic  ENT: EOMI, PERRLA, conjunctiva and sclera clear, Neck supple, No JVD, moist mucosa  CHEST/LUNG: Clear to auscultation bilaterally; No wheeze, equal breath sounds bilaterally   BACK: No spinal tenderness  HEART: Regular rate and rhythm; No murmurs, rubs, or gallops  ABDOMEN: Soft, Nontender, Nondistended; Bowel sounds present  EXTREMITIES:  No clubbing, cyanosis, or edema      Cardiovascular Diagnostic Testing:    ECG: Personally reviewed: Sinus rhythm with rate of 92bpm, no ST segment changes    Echo: None in system    Imaging:    CXR: Personally reviewed  Clear lungs    Labs: Personally reviewed                        12.0   8.59  )-----------( 180      ( 10 Apr 2024 06:00 )             37.4     04-10    130<L>  |  101  |  5<L>  ----------------------------<  119<H>  3.9   |  20<L>  |  0.50    Ca    8.3<L>      10 Apr 2024 06:00  Phos  2.8     04-10  Mg     2.00     04-10    TPro  TNP  /  Alb  2.8<L>  /  TBili  1.3<H>  /  DBili  x   /  AST  TNP  /  ALT  TNP  /  AlkPhos  118  04-10                CARDIAC MARKERS ( 10 Apr 2024 02:35 )  <6 ng/L / x     / x     / x     / x     / x      CARDIAC MARKERS ( 10 Apr 2024 01:10 )  <6 ng/L / x     / x     / x     / x     / x          No Known Allergies    acetaminophen     Tablet .. 650 milliGRAM(s) Oral every 6 hours PRN  aluminum hydroxide/magnesium hydroxide/simethicone Suspension 30 milliLiter(s) Oral every 4 hours PRN  cefepime   IVPB 2000 milliGRAM(s) IV Intermittent every 8 hours  melatonin 3 milliGRAM(s) Oral at bedtime PRN  methadone    Tablet 10 milliGRAM(s) Oral daily  nicotine - 21 mG/24Hr(s) Patch 1 Patch Transdermal daily  ondansetron Injectable 4 milliGRAM(s) IV Push every 8 hours PRN  sodium chloride 0.9%. 1000 milliLiter(s) IV Continuous <Continuous>  vancomycin  IVPB. 1000 milliGRAM(s) IV Intermittent every 8 hours    T(F): 97.9 (04-10), Max: 98.4 (04-10)  HR: 85 (04-10) (85 - 103)  BP: 150/86 (04-10) (148/93 - 150/103)  RR: 19 (04-10)  SpO2: 99% (04-10)

## 2024-04-10 NOTE — ED PROVIDER NOTE - SKIN, MLM
Skin normal color for race, warm, dry and intact. No evidence of rash. No osler's nodes, no janeway lesions.

## 2024-04-10 NOTE — H&P ADULT - NSHPLABSRESULTS_GEN_ALL_CORE
12.0   8.59  )-----------( 180      ( 10 Apr 2024 06:00 )             37.4       04-10    130<L>  |  101  |  5<L>  ----------------------------<  119<H>  3.9   |  20<L>  |  0.50    Ca    8.3<L>      10 Apr 2024 06:00  Phos  2.8     04-10  Mg     2.00     04-10    TPro  TNP  /  Alb  2.8<L>  /  TBili  1.3<H>  /  DBili  x   /  AST  TNP  /  ALT  TNP  /  AlkPhos  118  04-10                  Troponin T, High Sensitivity Result: <6 ng/L (04-10-24 @ 02:35)  Troponin T, High Sensitivity Result: <6 ng/L (04-10-24 @ 01:10)

## 2024-04-10 NOTE — H&P ADULT - PROBLEM SELECTOR PLAN 4
DVT ppx: SCDs  Diet Regular fluid restrict  Dispo pending cardiac/ infectious work up  Corrected calcium 9.3  SW consulted for addiction resources - Fluid restrict- appears to be euvolemic    - 130 in ED   - Trend Na

## 2024-04-10 NOTE — BH CONSULTATION LIAISON ASSESSMENT NOTE - NSBHATTESTCOMMENTATTENDFT_PSY_A_CORE
Chart reviewed. Pt seen separately; discussed with trainee. Discussed with primary team/RN. Sedated after Haldol PRN. Agree with above assessment/recs. Will continue to follow.

## 2024-04-10 NOTE — H&P ADULT - ASSESSMENT
52 y/o F with PMH of IV drug use (heroin), bipolar disorder and asthma presents with chest pain, fevers, chills body aches for the past few days. Pt admitted for ACS rule out and endocarditis work up.

## 2024-04-10 NOTE — H&P ADULT - PROBLEM SELECTOR PLAN 3
- Fluid restrict- appears to be euvolemic    - 130 in ED   - Trend Na - Last use yesterday. Was receiving methadone form [facility] but unable to reach. Will start with methadone 10 mg qd  - Psych consulted for up titration

## 2024-04-10 NOTE — H&P ADULT - NSHPREVIEWOFSYSTEMS_GEN_ALL_CORE
Review of Systems:   CONSTITUTIONAL: No fever, weight loss  EYES: No eye pain, visual disturbances, or discharge  ENMT:  No difficulty hearing, tinnitus, vertigo; No sinus or throat pain  RESPIRATORY: No SOB. No cough, wheezing, chills or hemoptysis  CARDIOVASCULAR: No chest pain, palpitations, dizziness, or leg swelling  GASTROINTESTINAL: No abdominal or epigastric pain. No nausea, vomiting, or hematemesis; No diarrhea or constipation. No melena or hematochezia.  GENITOURINARY: No dysuria, frequency, hematuria, or incontinence  NEUROLOGICAL: No headaches, memory loss, loss of strength, numbness, or tremors  SKIN: No itching, burning, rashes, or lesions   LYMPH NODES: No enlarged glands  ENDOCRINE: No heat or cold intolerance; No hair loss  MUSCULOSKELETAL: No joint pain or swelling; No muscle, back pain  PSYCHIATRIC: No depression, anxiety, mood swings, or difficulty sleeping  HEME/LYMPH: No easy bruising, or bleeding gums Review of Systems:   CONSTITUTIONAL: No fever, weight loss  ENMT:  No difficulty hearing, tinnitus, vertigo; No sinus or throat pain  RESPIRATORY: No SOB. No cough, wheezing, chills or hemoptysis  CARDIOVASCULAR: No palpitations, dizziness, or leg swelling  GASTROINTESTINAL: No abdominal or epigastric pain. No nausea, vomiting, or hematemesis; No diarrhea or constipation.  GENITOURINARY: No dysuria, frequency, hematuria, or incontinence  NEUROLOGICAL: No headaches  SKIN: No itching, burning, rashes, or lesions   MUSCULOSKELETAL: No joint pain or swelling  PSYCHIATRIC: +anxiety

## 2024-04-10 NOTE — BH CONSULTATION LIAISON ASSESSMENT NOTE - NSBHCHARTREVIEWVS_PSY_A_CORE FT
Vital Signs Last 24 Hrs  T(C): 36.6 (10 Apr 2024 06:55), Max: 36.9 (10 Apr 2024 05:35)  T(F): 97.9 (10 Apr 2024 06:55), Max: 98.4 (10 Apr 2024 05:35)  HR: 85 (10 Apr 2024 06:55) (85 - 103)  BP: 150/86 (10 Apr 2024 06:55) (148/93 - 150/103)  BP(mean): --  RR: 19 (10 Apr 2024 06:55) (18 - 19)  SpO2: 99% (10 Apr 2024 06:55) (99% - 100%)    Parameters below as of 10 Apr 2024 06:55  Patient On (Oxygen Delivery Method): room air

## 2024-04-10 NOTE — BH CONSULTATION LIAISON ASSESSMENT NOTE - NSBHCHARTREVIEWLAB_PSY_A_CORE FT
12.0   8.59  )-----------( 180      ( 10 Apr 2024 06:00 )             37.4     04-10    130<L>  |  101  |  5<L>  ----------------------------<  119<H>  3.9   |  20<L>  |  0.50    Ca    8.3<L>      10 Apr 2024 06:00  Phos  2.8     04-10  Mg     2.00     04-10    TPro  TNP  /  Alb  2.8<L>  /  TBili  1.3<H>  /  DBili  x   /  AST  TNP  /  ALT  TNP  /  AlkPhos  118  04-10    Urinalysis Basic - ( 10 Apr 2024 06:00 )    Color: x / Appearance: x / SG: x / pH: x  Gluc: 119 mg/dL / Ketone: x  / Bili: x / Urobili: x   Blood: x / Protein: x / Nitrite: x   Leuk Esterase: x / RBC: x / WBC x   Sq Epi: x / Non Sq Epi: x / Bacteria: x

## 2024-04-10 NOTE — H&P ADULT - HISTORY OF PRESENT ILLNESS
52 y/o F with PMH of IV drug use (heroin/ cocaine?), bipolar disorder and asthma presents with chest pain, fevers, chills body aches for the past few days. She reports the chest pain is constant. She was getting methadone from the [facility] methadone clinic but stopped 1 week ago and has been using heroin. Pt was seen on March 12th for cellulitis of injection site at left AC fossa and was discharged with antibiotics which she reports taking. Pt denies vomiting, nausea, diarrhea, joint back or pain.

## 2024-04-10 NOTE — ED PROVIDER NOTE - CLINICAL SUMMARY MEDICAL DECISION MAKING FREE TEXT BOX
54 Y/O F H/O IVDU (heroin use, last used yesterday), Asthma, Daily smoker, Bipolar disorder and recent admission for UE cellulitis states that for the past 2 days she has had chills, chest pain, shortness of breath and bodyaches as well as fatigue. Due to pt's sx of chest pain, shortness of breath, fatigue and body pain, H/O IVDU and recent + MRSA as well as poor dentition endocarditis should be evaluated. Blood cultures x3 ordered and pt was ordered for antibiotics in the interm. Planning admission to telemetry, CXR ordered to R/O consolidation, echo ordered to evaluate for abnormal valve/vegetation while awaiting blood culture results. Troponin and EKG ordered to evaluate for ACS.

## 2024-04-10 NOTE — H&P ADULT - TIME BILLING
- Ordering, reviewing, and interpreting labs, testing  - Independently obtaining a review of systems and performing a physical exam  - Reviewing consultant documentation/recommendations in addition to discussing plan of care with consultants.  - Counselling and educating patient regarding interpretation of aforementioned items and plan of care.

## 2024-04-10 NOTE — ED ADULT NURSE NOTE - NSFALLUNIVINTERV_ED_ALL_ED
Bed/Stretcher in lowest position, wheels locked, appropriate side rails in place/Call bell, personal items and telephone in reach/Instruct patient to call for assistance before getting out of bed/chair/stretcher/Non-slip footwear applied when patient is off stretcher/Gay to call system/Physically safe environment - no spills, clutter or unnecessary equipment/Purposeful proactive rounding/Room/bathroom lighting operational, light cord in reach

## 2024-04-10 NOTE — ED PROVIDER NOTE - ATTENDING APP SHARED VISIT CONTRIBUTION OF CARE
Patient to be readmitted to r/o BE after recent admission for CAMRSA infection with RF as outlined above  Plan   labs   cxs x 3   antibiotics until cultures "claen"  Reassessment

## 2024-04-10 NOTE — ED PROVIDER NOTE - OBJECTIVE STATEMENT
52 Y/O F H/O IVDU (heroin use, last used yesterday), Asthma, Daily smoker, Bipolar disorder and recent admission for UE cellulitis states that for the past 2 days she has had chills, chest pain, shortness of breath and bodyaches as well as fatigue. Pt states the chest pain increases with activity. Pt denies sick contacts or cough. Pt denies any other sx or acute complaints.

## 2024-04-11 DIAGNOSIS — R07.9 CHEST PAIN, UNSPECIFIED: ICD-10-CM

## 2024-04-11 PROCEDURE — 99232 SBSQ HOSP IP/OBS MODERATE 35: CPT

## 2024-04-11 PROCEDURE — 99233 SBSQ HOSP IP/OBS HIGH 50: CPT

## 2024-04-11 RX ORDER — INFLUENZA VIRUS VACCINE 15; 15; 15; 15 UG/.5ML; UG/.5ML; UG/.5ML; UG/.5ML
0.5 SUSPENSION INTRAMUSCULAR ONCE
Refills: 0 | Status: DISCONTINUED | OUTPATIENT
Start: 2024-04-11 | End: 2024-04-16

## 2024-04-11 NOTE — BH CONSULTATION LIAISON PROGRESS NOTE - MSE UNSTRUCTURED FT
Patient seen in the ED, on 1:1. Lays on a stretcher, eyes closed, refuses to engage saying "go away". Limited exam

## 2024-04-11 NOTE — BH CONSULTATION LIAISON PROGRESS NOTE - NSICDXBHSECONDARYDX_PSY_ALL_CORE
ACS (acute coronary syndrome)   I24.9  Heroin dependence   F11.20  Hyponatremia   E87.1   Heroin dependence   F11.20

## 2024-04-11 NOTE — PROGRESS NOTE ADULT - SUBJECTIVE AND OBJECTIVE BOX
Patient seen and examined at bedside.    Overnight Events: No overnight events. Denies chest pain, shortness of breath. PAtient refusing TTE and vitals. She was placed on 1:1 for passive suicidal ideations.     Review Of Systems: No chest pain, shortness of breath, or palpitations            Current Meds:  acetaminophen     Tablet .. 650 milliGRAM(s) Oral every 6 hours PRN  aluminum hydroxide/magnesium hydroxide/simethicone Suspension 30 milliLiter(s) Oral every 4 hours PRN  cefepime   IVPB 2000 milliGRAM(s) IV Intermittent every 8 hours  haloperidol    Injectable 5 milliGRAM(s) IntraMuscular every 8 hours PRN  LORazepam   Injectable 2 milliGRAM(s) IntraMuscular every 8 hours PRN  melatonin 3 milliGRAM(s) Oral at bedtime PRN  methadone    Tablet 10 milliGRAM(s) Oral daily  nicotine - 21 mG/24Hr(s) Patch 1 Patch Transdermal daily  ondansetron Injectable 4 milliGRAM(s) IV Push every 8 hours PRN  sodium chloride 0.9%. 1000 milliLiter(s) IV Continuous <Continuous>  vancomycin  IVPB 1000 milliGRAM(s) IV Intermittent every 12 hours      Vitals:  T(F): 98 (04-10), Max: 98 (04-10)  HR: 82 (04-10) (82 - 82)  BP: 146/80 (04-10) (146/80 - 146/80)  RR: 18 (04-10)  SpO2: 100% (04-10)  I&O's Summary      Physical Exam:  Appearance: No acute distress; well appearing  Eyes: PERRL, EOMI, pink conjunctiva  HEENT: Normal oral mucosa  Cardiovascular: RRR, S1, S2, no murmurs, rubs, or gallops; no edema; no JVD  Respiratory: Clear to auscultation bilaterally  Gastrointestinal: soft, non-tender, non-distended with normal bowel sounds  Extremities: No LE edema, warm extremties, 2+ DP  Musculoskeletal: No clubbing; no joint deformity                             12.0   8.59  )-----------( 180      ( 10 Apr 2024 06:00 )             37.4     04-10    130<L>  |  101  |  5<L>  ----------------------------<  119<H>  3.9   |  20<L>  |  0.50    Ca    8.3<L>      10 Apr 2024 06:00  Phos  2.8     04-10  Mg     2.00     04-10    TPro  TNP  /  Alb  2.8<L>  /  TBili  1.3<H>  /  DBili  x   /  AST  TNP  /  ALT  TNP  /  AlkPhos  118  04-10      CARDIAC MARKERS ( 10 Apr 2024 02:35 )  <6 ng/L / x     / x     / x     / x     / x      CARDIAC MARKERS ( 10 Apr 2024 01:10 )  <6 ng/L / x     / x     / x     / x     / x

## 2024-04-11 NOTE — BH CONSULTATION LIAISON PROGRESS NOTE - NSBHFUPINTERVALHXFT_PSY_A_CORE
Chart reviewed, no PRN's, refusing all medical interventions.    Patient seen in the ED, on 1:1. Lays on a stretcher, eyes closed, refuses to engage saying "go away". Limited exam.   Per PCA, she is refusing to engage with any provider and didn't eat her meals.

## 2024-04-11 NOTE — BH CONSULTATION LIAISON PROGRESS NOTE - NSBHASSESSMENTFT_PSY_ALL_CORE
54 y/o F with self reported h/o Bipolar disorder and with PMH of IV drug use (heroin/ cocaine?) and asthma presents with chest pain, fevers, chills body aches for the past few days. She was getting methadone from the [facility] methadone clinic but stopped 1 week ago and has been using Heroin. Pt was seen on March 12th for cellulitis and was discharged with antibiotics. Pt admitted for ACS rule out and endocarditis work up.   [facility] psychiatry called for SI as the patient made a statement requesting a needle to end her life claiming she has DNR DNI at home  Patient seen in the ED, on 1:1. Evasive on exam, guarded. Received Haldol PRN earlier for agitation, perseverates on discharge, poor insight, not much forthcoming with the evaluation.   4/11: No PRN's overnight, refusing medical interventions, uncooperative on exam.     Plan:  CW 1:1 observation for risk of elopement  Obtain Utox  SBIRT consult for substance use  Continue home meds  PRN- Haldol 5 mg IV/IM Q8, Ativan 2 mg IV/IM Q8 for agitation if QTc <500  COWS monitoring for opiate use d/o (consider Methadone taper of 30mg day 1, then 20mg day 2, then 10mg day 3, then discontinue)  Continue rest of the medical workup  [facility] will follow, patient can not leave AMA; dispo pending   52 y/o F with self reported h/o Bipolar disorder and with PMH of IV drug use (heroin/ cocaine?) and asthma presents with chest pain, fevers, chills body aches for the past few days. She was getting methadone from the [facility] methadone clinic but stopped 1 week ago and has been using Heroin. Pt was seen on March 12th for cellulitis and was discharged with antibiotics. Pt admitted for ACS rule out and endocarditis work up.   [facility] psychiatry called for SI as the patient made a statement requesting a needle to end her life claiming she has DNR DNI at home  Patient seen in the ED, on 1:1. Evasive on exam, guarded. Received Haldol PRN earlier for agitation, perseverates on discharge, poor insight, not much forthcoming with the evaluation.   4/11: No PRN's overnight, refusing medical interventions, uncooperative on exam.     Plan:  CW 1:1 observation for risk of elopement  Obtain Utox  SBIRT consult for substance use  Continue home meds  PRN- Haldol 5 mg IV/IM Q8, Ativan 2 mg IV/IM Q8 for agitation if QTc <500  COWS monitoring for opiate use d/o (consider Methadone taper of 30mg day 1, then 20mg day 2, then 10mg day 3, then discontinue) if qtc <500  Continue rest of the medical workup  CL will follow, patient can not leave AMA; dispo pending

## 2024-04-11 NOTE — BH CONSULTATION LIAISON PROGRESS NOTE - NSBHATTESTCOMMENTATTENDFT_PSY_A_CORE
Handoff received from Dr. Ying, chart reviewed, pt. seen/evaluated, I agree with above assessment/plan. Patient uncooperative, eyes closed and did not engage in interview, stating " no, I don't want to talk". Interview limited. Plan as above, will follow

## 2024-04-11 NOTE — PATIENT PROFILE ADULT - FALL HARM RISK - HARM RISK INTERVENTIONS

## 2024-04-11 NOTE — PATIENT PROFILE ADULT - FALL HARM RISK - DEVICES
CC: Post-partum follow-up .   S/P  delivery    Bert Rider is a 27 y.o. female  who presents for post-partum visit.  She is S/P a  Delivery    She and the baby are doing well.  No pain.  No fever.   No bowel / bladder complaints.       Breast Feeding: YES  Depression: NO  Contraception: no method    Pregnancy was complicated by:  Hx C/S x1   Hx Panic attacks       BP (!) 142/90 (BP Location: Left arm, Patient Position: Sitting, BP Method: Large (Manual))   Wt 93 kg (205 lb 0.4 oz)   LMP 2022 (Approximate)   Breastfeeding Yes   BMI 36.32 kg/m²     ROS:  GENERAL: No fever, chills, fatigability.  VULVAR: No pain, no lesions and no itching.  VAGINAL: No relaxation, no itching, no discharge, no abnormal bleeding and no lesions.  ABDOMEN: No abdominal pain. Denies nausea. Denies vomiting. No diarrhea. No constipation  BREAST: Denies pain. No lumps. No discharge.  URINARY: No incontinence, no nocturia, no frequency and no dysuria.  CARDIOVASCULAR: No chest pain. No shortness of breath. No leg cramps.  NEUROLOGICAL: No headaches. No vision changes.    PHYSICAL EXAM:  Exam chaperoned by nurse  ABDOMEN:  Soft, non-tender, non-distended.  Incision clean, intact healing well.  No pelvic today       DX:  Pregnancy induced hypertension   P/C ratio and labs today   No preeclapsia symptoms      S/P  Delivery   Doing well   Continue Pelvic rest   Instructions / precautions reviewed  Contraceptive counseling given     Hx Panic attacks  Zoloft prescription     PLAN:  RTC in 1 week    Patricio Valerio M.D.   OB/GYN        
None

## 2024-04-11 NOTE — PROGRESS NOTE ADULT - SUBJECTIVE AND OBJECTIVE BOX
[facility] Division of Hospital Medicine  Dl Najera MD  In House Pager [phone]    Patient is a 53y old  Female who presents with a chief complaint of chest pain (11 Apr 2024 07:56)    OVERNIGHT EVENTS: no acute events.   SUBJECTIVE: no new subjective symptoms. wants to sleep, repeatedly stating "go away" during conversation refusing to answer questions or exams.   ROS: patient refused to answer.     MEDICATIONS  (STANDING):  cefepime   IVPB 2000 milliGRAM(s) IV Intermittent every 8 hours  methadone    Tablet 10 milliGRAM(s) Oral daily  nicotine - 21 mG/24Hr(s) Patch 1 Patch Transdermal daily  sodium chloride 0.9%. 1000 milliLiter(s) (75 mL/Hr) IV Continuous <Continuous>  vancomycin  IVPB 1000 milliGRAM(s) IV Intermittent every 12 hours    MEDICATIONS  (PRN):  acetaminophen     Tablet .. 650 milliGRAM(s) Oral every 6 hours PRN Temp greater or equal to 38C (100.4F), Mild Pain (1 - 3)  aluminum hydroxide/magnesium hydroxide/simethicone Suspension 30 milliLiter(s) Oral every 4 hours PRN Dyspepsia  haloperidol    Injectable 5 milliGRAM(s) IntraMuscular every 8 hours PRN Agitation  LORazepam   Injectable 2 milliGRAM(s) IntraMuscular every 8 hours PRN Agitation  melatonin 3 milliGRAM(s) Oral at bedtime PRN Insomnia  ondansetron Injectable 4 milliGRAM(s) IV Push every 8 hours PRN Nausea and/or Vomiting    CAPILLARY BLOOD GLUCOSE        I&O's Summary      Vital Signs Last 24 Hrs  T(C): 36.7 (10 Apr 2024 19:10), Max: 36.7 (10 Apr 2024 19:10)  T(F): 98 (10 Apr 2024 19:10), Max: 98 (10 Apr 2024 19:10)  HR: 82 (10 Apr 2024 19:10) (82 - 82)  BP: 146/80 (10 Apr 2024 19:10) (146/80 - 146/80)  BP(mean): --  RR: 18 (10 Apr 2024 19:10) (18 - 18)  SpO2: 100% (10 Apr 2024 19:10) (100% - 100%)    Parameters below as of 10 Apr 2024 19:10  Patient On (Oxygen Delivery Method): room air        LABS:                        12.0   8.59  )-----------( 180      ( 10 Apr 2024 06:00 )             37.4     04-10    130<L>  |  101  |  5<L>  ----------------------------<  119<H>  3.9   |  20<L>  |  0.50    Ca    8.3<L>      10 Apr 2024 06:00  Phos  2.8     04-10  Mg     2.00     04-10    TPro  TNP  /  Alb  2.8<L>  /  TBili  1.3<H>  /  DBili  x   /  AST  TNP  /  ALT  TNP  /  AlkPhos  118  04-10          Urinalysis Basic - ( 10 Apr 2024 06:00 )    Color: x / Appearance: x / SG: x / pH: x  Gluc: 119 mg/dL / Ketone: x  / Bili: x / Urobili: x   Blood: x / Protein: x / Nitrite: x   Leuk Esterase: x / RBC: x / WBC x   Sq Epi: x / Non Sq Epi: x / Bacteria: x        Culture - Blood (collected 10 Apr 2024 01:00)  Source: .Blood Blood-Peripheral  Preliminary Report (11 Apr 2024 07:02):    No growth at 24 hours    Culture - Blood (collected 10 Apr 2024 00:45)  Source: .Blood Blood  Preliminary Report (11 Apr 2024 07:02):    No growth at 24 hours    Culture - Blood (collected 10 Apr 2024 00:30)  Source: .Blood Blood-Venous  Preliminary Report (11 Apr 2024 07:02):    No growth at 24 hours      RADIOLOGY & ADDITIONAL TESTS:  Results Reviewed: Y  Imaging Personally Reviewed: Y  Electrocardiogram Personally Reviewed: Y    COORDINATION OF CARE:  Care Discussed with Consultants/Other Providers [Y/N]: Y  Prior or Outpatient Records Reviewed [Y/N]: Y

## 2024-04-12 LAB
A1C WITH ESTIMATED AVERAGE GLUCOSE RESULT: 5.2 % — SIGNIFICANT CHANGE UP (ref 4–5.6)
ALBUMIN SERPL ELPH-MCNC: 3 G/DL — LOW (ref 3.3–5)
ALBUMIN SERPL ELPH-MCNC: 3.1 G/DL — LOW (ref 3.3–5)
ALP SERPL-CCNC: 129 U/L — HIGH (ref 40–120)
ALP SERPL-CCNC: 138 U/L — HIGH (ref 40–120)
ALT FLD-CCNC: 687 U/L — HIGH (ref 4–33)
ALT FLD-CCNC: 712 U/L — HIGH (ref 4–33)
ANION GAP SERPL CALC-SCNC: 10 MMOL/L — SIGNIFICANT CHANGE UP (ref 7–14)
ANION GAP SERPL CALC-SCNC: 11 MMOL/L — SIGNIFICANT CHANGE UP (ref 7–14)
AST SERPL-CCNC: 755 U/L — HIGH (ref 4–32)
AST SERPL-CCNC: 843 U/L — HIGH (ref 4–32)
BILIRUB SERPL-MCNC: 1.3 MG/DL — HIGH (ref 0.2–1.2)
BILIRUB SERPL-MCNC: 1.3 MG/DL — HIGH (ref 0.2–1.2)
BUN SERPL-MCNC: 8 MG/DL — SIGNIFICANT CHANGE UP (ref 7–23)
BUN SERPL-MCNC: 8 MG/DL — SIGNIFICANT CHANGE UP (ref 7–23)
CALCIUM SERPL-MCNC: 8.2 MG/DL — LOW (ref 8.4–10.5)
CALCIUM SERPL-MCNC: 8.3 MG/DL — LOW (ref 8.4–10.5)
CHLORIDE SERPL-SCNC: 98 MMOL/L — SIGNIFICANT CHANGE UP (ref 98–107)
CHLORIDE SERPL-SCNC: 98 MMOL/L — SIGNIFICANT CHANGE UP (ref 98–107)
CHOLEST SERPL-MCNC: 113 MG/DL — SIGNIFICANT CHANGE UP
CO2 SERPL-SCNC: 21 MMOL/L — LOW (ref 22–31)
CO2 SERPL-SCNC: 23 MMOL/L — SIGNIFICANT CHANGE UP (ref 22–31)
CREAT SERPL-MCNC: 0.43 MG/DL — LOW (ref 0.5–1.3)
CREAT SERPL-MCNC: 0.46 MG/DL — LOW (ref 0.5–1.3)
EGFR: 116 ML/MIN/1.73M2 — SIGNIFICANT CHANGE UP
EGFR: 118 ML/MIN/1.73M2 — SIGNIFICANT CHANGE UP
ESTIMATED AVERAGE GLUCOSE: 103 — SIGNIFICANT CHANGE UP
GLUCOSE SERPL-MCNC: 129 MG/DL — HIGH (ref 70–99)
GLUCOSE SERPL-MCNC: 174 MG/DL — HIGH (ref 70–99)
HCT VFR BLD CALC: 41.2 % — SIGNIFICANT CHANGE UP (ref 34.5–45)
HDLC SERPL-MCNC: 13 MG/DL — LOW
HGB BLD-MCNC: 13.6 G/DL — SIGNIFICANT CHANGE UP (ref 11.5–15.5)
LIPID PNL WITH DIRECT LDL SERPL: 63 MG/DL — SIGNIFICANT CHANGE UP
MAGNESIUM SERPL-MCNC: 2.1 MG/DL — SIGNIFICANT CHANGE UP (ref 1.6–2.6)
MAGNESIUM SERPL-MCNC: 2.1 MG/DL — SIGNIFICANT CHANGE UP (ref 1.6–2.6)
MCHC RBC-ENTMCNC: 25.8 PG — LOW (ref 27–34)
MCHC RBC-ENTMCNC: 33 GM/DL — SIGNIFICANT CHANGE UP (ref 32–36)
MCV RBC AUTO: 78.2 FL — LOW (ref 80–100)
NON HDL CHOLESTEROL: 100 MG/DL — SIGNIFICANT CHANGE UP
NRBC # BLD: 0 /100 WBCS — SIGNIFICANT CHANGE UP (ref 0–0)
NRBC # FLD: 0 K/UL — SIGNIFICANT CHANGE UP (ref 0–0)
PHOSPHATE SERPL-MCNC: 2 MG/DL — LOW (ref 2.5–4.5)
PHOSPHATE SERPL-MCNC: 2.3 MG/DL — LOW (ref 2.5–4.5)
PLATELET # BLD AUTO: 216 K/UL — SIGNIFICANT CHANGE UP (ref 150–400)
POTASSIUM SERPL-MCNC: 3.8 MMOL/L — SIGNIFICANT CHANGE UP (ref 3.5–5.3)
POTASSIUM SERPL-MCNC: SIGNIFICANT CHANGE UP MMOL/L (ref 3.5–5.3)
POTASSIUM SERPL-SCNC: 3.8 MMOL/L — SIGNIFICANT CHANGE UP (ref 3.5–5.3)
POTASSIUM SERPL-SCNC: SIGNIFICANT CHANGE UP MMOL/L (ref 3.5–5.3)
PROT SERPL-MCNC: 7 G/DL — SIGNIFICANT CHANGE UP (ref 6–8.3)
PROT SERPL-MCNC: 7.6 G/DL — SIGNIFICANT CHANGE UP (ref 6–8.3)
RBC # BLD: 5.27 M/UL — HIGH (ref 3.8–5.2)
RBC # FLD: 16.1 % — HIGH (ref 10.3–14.5)
SODIUM SERPL-SCNC: 129 MMOL/L — LOW (ref 135–145)
SODIUM SERPL-SCNC: 132 MMOL/L — LOW (ref 135–145)
TRIGL SERPL-MCNC: 183 MG/DL — HIGH
WBC # BLD: 7.78 K/UL — SIGNIFICANT CHANGE UP (ref 3.8–10.5)
WBC # FLD AUTO: 7.78 K/UL — SIGNIFICANT CHANGE UP (ref 3.8–10.5)

## 2024-04-12 PROCEDURE — 99232 SBSQ HOSP IP/OBS MODERATE 35: CPT

## 2024-04-12 PROCEDURE — 99233 SBSQ HOSP IP/OBS HIGH 50: CPT

## 2024-04-12 RX ADMIN — Medication 1 PATCH: at 11:21

## 2024-04-12 RX ADMIN — CEFEPIME 100 MILLIGRAM(S): 1 INJECTION, POWDER, FOR SOLUTION INTRAMUSCULAR; INTRAVENOUS at 18:36

## 2024-04-12 RX ADMIN — CEFEPIME 100 MILLIGRAM(S): 1 INJECTION, POWDER, FOR SOLUTION INTRAMUSCULAR; INTRAVENOUS at 11:23

## 2024-04-12 RX ADMIN — Medication 250 MILLIGRAM(S): at 18:36

## 2024-04-12 RX ADMIN — METHADONE HYDROCHLORIDE 10 MILLIGRAM(S): 40 TABLET ORAL at 11:22

## 2024-04-12 RX ADMIN — Medication 1 PATCH: at 19:20

## 2024-04-12 NOTE — BH CONSULTATION LIAISON PROGRESS NOTE - NSBHFUPINTERVALHXFT_PSY_A_CORE
Chart reviewed, no PRN's, refusing all medical interventions including IV insertion, medications and labs but went for Echo this morning.  Patient seen, on 1:1. Laying on bed, irritable, angry, cursing at the 1:1 PCA making derogatory comments. Asking to be released off the 1:1 saying that she doesn't need to be watched over 24/7. Explained to her the reason for having 1:1 but she remained argumentative, loud and wouldn't let the providers speak to her. Said that she is here as she "was sick" but doesn't want any interventions as she is "DNI/DNR". Asked if we can speak to her family but she replied that she doesn't have anyone and hasn't spoken to her sister in 2 years.  Refuses to answer about AH, VH, SI/HI.   Doesn't appear internally preoccupied.  Chart reviewed, no PRN's, refusing all medical interventions including IV insertion, medications and labs but went for Echo this morning.  Patient seen, on 1:1. Laying on bed, irritable, angry, cursing at the 1:1 PCA making derogatory comments. Asking to be released off the 1:1 saying that she doesn't need to be watched over 24/7. Explained to her the reason for having 1:1 but she remained argumentative, loud and wouldn't let the providers speak to her. Said that she is here as she "was sick" but doesn't want any interventions as she is "DNI/DNR". Asked if we can speak to her family but she replied that she doesn't have anyone and hasn't spoken to her sister in 2 years.  Refuses to answer about AH, VH, HI. Denies SI.    Doesn't appear internally preoccupied.  Chart reviewed, no PRN's, refusing all medical interventions including IV insertion, medications and labs but went for Echo this morning.  Patient seen, on 1:1. Laying on bed, irritable, angry, cursing at the 1:1 PCA making derogatory comments. Asking to be released off the 1:1 saying that she doesn't need to be watched over . Explained to her the reason for having 1:1 but she remained argumentative, loud and wouldn't let the providers speak to her. Said that she is here as she "was sick" but doesn't want any interventions as she is "DNI/DNR". Asked if we can speak to her family but she replied that she doesn't have anyone and hasn't spoken to her sister in 2 years.  Refuses to answer about AH, VH, HI. Denies SI.  Doesn't appear internally preoccupied.     Collateral info: Sister: She said she used to live with her sister about 1.5 years but then moved out. She doesn't have contact with her since then. She has h/o Bipolar disorder diagnosed in her 20's with multiple hospitalizations at [facility], used to be on Klonopin in the past but doesn't know if she is taking any medications currently. She has h/o opiate use- Heroine, has been to Rehab in the past. She also got diagnosed with learning disability in grade 3. She is single but had a child at age 17 who now lives in [place]. Patient communicates with her daughter via text. Patient has h/o SI in the past but is not aware of any SA. FH- Mother had h/o Bipolar disorder and  of CHF. She said that the patient is on disability but has a , no further details. She would like to remain aware of the patient's dispo. Chart reviewed, no PRN's, refusing all medical interventions including IV insertion, medications and labs but went for Echo this morning.  Patient seen, on 1:1. Laying on bed, irritable, angry, cursing at the 1:1 PCA making derogatory comments. Asking to be released off the 1:1 saying that she doesn't need to be watched over . Explained to her the reason for having 1:1 but she remained argumentative, loud and wouldn't let the providers speak to her. Said that she is here as she "was sick" but doesn't want any interventions as she is "DNI/DNR". Asked if we can speak to her family but she replied that she doesn't have anyone and hasn't spoken to her sister in 2 years.  Refuses to answer about AH, VH, HI. Denies SI.  Doesn't appear internally preoccupied.     Collateral info: Sister (pt gave consent): She said she used to live with her sister about 1.5 years but then moved out. She doesn't have contact with her since then. She has h/o Bipolar disorder diagnosed in her 20's with multiple hospitalizations at [facility], used to be on Klonopin in the past but doesn't know if she is taking any medications currently. She has h/o opiate use- Heroine, has been to Rehab in the past. She also got diagnosed with learning disability in grade 3. She is single but had a child at age 17 who now lives in [place]. Patient communicates with her daughter via text. Patient has h/o SI in the past but is not aware of any SA. FH- Mother had h/o Bipolar disorder and  of CHF. She said that the patient is on disability but has a , no further details. She would like to remain aware of the patient's dispo.

## 2024-04-12 NOTE — BH CONSULTATION LIAISON PROGRESS NOTE - NSBHATTESTBILLING_PSY_A_CORE
11247-Avjoymvicb OBS or IP - moderate complexity OR 35-49 mins 85897-Vhmesswqud OBS or IP - high complexity OR 50-79 mins

## 2024-04-12 NOTE — BH CONSULTATION LIAISON PROGRESS NOTE - NSBHASSESSMENTFT_PSY_ALL_CORE
52 y/o F with self reported h/o Bipolar disorder and with PMH of IV drug use (heroin/ cocaine?) and asthma presents with chest pain, fevers, chills body aches for the past few days. She was getting methadone from the [facility] methadone clinic but stopped 1 week ago and has been using Heroin. Pt was seen on March 12th for cellulitis and was discharged with antibiotics. Pt admitted for ACS rule out and endocarditis work up.   CL psychiatry called for SI as the patient made a statement requesting a needle to end her life claiming she has DNR DNI at home  Patient seen in the ED, on 1:1. Evasive on exam, guarded. Received Haldol PRN earlier for agitation, perseverates on discharge, poor insight, not much forthcoming with the evaluation.   4/11: No PRN's overnight, refusing medical interventions, uncooperative on exam.     4/12: Extremely uncooperative, irritable, angry, loud/screaming, cursing at the 1:1 PCA making derogatory comments. Doesn't appear internally preoccupied.  Refuses to answer about AH, VH, SI/HI. No PRN's overnight, refusing medical interventions    Plan:  CW 1:1 observation for risk of elopement  Obtain Utox  SBIRT consult for substance use  Continue home meds  PRN- Haldol 5 mg IV/IM Q8 + Ativan 2 mg IV/IM Q8 for agitation if QTc <500  COWS monitoring for opiate use d/o (consider Methadone taper of 30mg day 1, then 20mg day 2, then 10mg day 3, then discontinue) if qtc <500  Continue rest of the medical workup  CL will follow, patient can not leave AMA; dispo pending  Patient does not have capacity to refuse any emergent medical interventions, labs or investigations    54 y/o F with self reported h/o Bipolar disorder and with PMH of IV drug use (heroin/ cocaine?) and asthma presents with chest pain, fevers, chills body aches for the past few days. She was getting methadone from the [facility] methadone clinic but stopped 1 week ago and has been using Heroin. Pt was seen on March 12th for cellulitis and was discharged with antibiotics. Pt admitted for ACS rule out and endocarditis work up.   CL psychiatry called for SI as the patient made a statement requesting a needle to end her life claiming she has DNR DNI at home  Patient seen in the ED, on 1:1. Evasive on exam, guarded. Received Haldol PRN earlier for agitation, perseverates on discharge, poor insight, not much forthcoming with the evaluation.   4/11: No PRN's overnight, refusing medical interventions, uncooperative on exam.     4/12: Extremely uncooperative, irritable, angry, loud/screaming, cursing at the 1:1 PCA making derogatory comments. Doesn't appear internally preoccupied.  Refuses to answer about AH, VH, HI. Denies SI.  No PRN's overnight, refusing medical interventions. Poor insight into her medical condition.     Plan:  CW 1:1 observation for risk of elopement  Obtain Utox  SBIRT consult for substance use  Continue home meds  PRN- Haldol 5 mg IV/IM Q8 + Ativan 2 mg IV/IM Q8 for agitation if QTc <500  COWS monitoring for opiate use d/o (consider Methadone taper of 30mg day 1, then 20mg day 2, then 10mg day 3, then discontinue) if qtc <500  Continue rest of the medical workup  CL will follow, patient can not leave AMA; dispo pending  Patient does not have capacity to refuse any emergent medical interventions, labs or investigations    54 y/o F with self reported h/o Bipolar disorder and with PMH of IV drug use (heroin/ cocaine?) and asthma presents with chest pain, fevers, chills body aches for the past few days. She was getting methadone from the [facility] methadone clinic but stopped 1 week ago and has been using Heroin. Pt was seen on March 12th for cellulitis and was discharged with antibiotics. Pt admitted for ACS rule out and endocarditis work up.   CL psychiatry called for SI as the patient made a statement requesting a needle to end her life claiming she has DNR DNI at home  Patient seen in the ED, on 1:1. Evasive on exam, guarded. Received Haldol PRN earlier for agitation, perseverates on discharge, poor insight, not much forthcoming with the evaluation.   4/11: No PRN's overnight, refusing medical interventions, uncooperative on exam.     4/12: Extremely uncooperative, irritable, angry, loud/screaming, cursing at the 1:1 PCA making derogatory comments. Doesn't appear internally preoccupied.  Refuses to answer about AH, VH, HI. Denies SI.  No PRN's overnight, refusing medical interventions. Poor insight into her medical condition.     Plan:  CW 1:1 observation for risk of elopement  Obtain Utox  SBIRT consult for substance use  Continue home meds  START ZYPREXA 5 mg PO QHS for mood s/s  PRN- Haldol 5 mg IV/IM Q8 + Ativan 2 mg IV/IM Q8 for agitation if QTc <500  COWS monitoring for opiate use d/o (consider Methadone taper of 30mg day 1, then 20mg day 2, then 10mg day 3, then discontinue) if qtc <500  Continue rest of the medical workup  CL will follow, patient can not leave AMA; dispo pending  Patient does not have capacity to refuse any emergent medical interventions, labs or investigations    52 y/o F with self reported h/o Bipolar disorder and with PMH of IV drug use (heroin/ cocaine?) and asthma presents with chest pain, fevers, chills body aches for the past few days. She was getting methadone from the [facility] methadone clinic but stopped 1 week ago and has been using Heroin. Pt was seen on March 12th for cellulitis and was discharged with antibiotics. Pt admitted for ACS rule out and endocarditis work up.   CL psychiatry called for SI as the patient made a statement requesting a needle to end her life claiming she has DNR DNI at home  Patient seen in the ED, on 1:1. Evasive on exam, guarded. Received Haldol PRN earlier for agitation, perseverates on discharge, poor insight, not much forthcoming with the evaluation.   4/11: No PRN's overnight, refusing medical interventions, uncooperative on exam.     4/12: Extremely uncooperative, irritable, angry, loud/screaming, cursing at the 1:1 PCA making derogatory comments. Doesn't appear internally preoccupied.  Refuses to answer about AH, VH, HI. Denies SI.  No PRN's overnight, refusing medical interventions. Poor insight into her medical condition.     Plan:  CW 1:1 observation for risk of elopement  Obtain Utox  SBIRT consult for substance use  START ZYPREXA 5 mg PO QHS for mood s/s if qtc <500  PRN- Haldol 5 mg IV/IM Q8 + Ativan 2 mg IV/IM Q8 for agitation if QTc <500  COWS monitoring for opiate use d/o (consider Methadone taper of 30mg day 1, then 20mg day 2, then 10mg day 3, then discontinue) if qtc <500  Continue rest of the medical workup  CL will follow, patient can not leave AMA; dispo pending  Patient does not have capacity to refuse any emergent medical interventions, labs or investigations     Care coordinated with pt.'s sister

## 2024-04-12 NOTE — BH CONSULTATION LIAISON PROGRESS NOTE - MSE UNSTRUCTURED FT
Patient seen, on 1:1. Laying on bed, irritable, angry, loud/screaming, cursing at the 1:1 PCA making derogatory comments. Refuses to answer about AH, VH, SI/HI.  Patient seen, on 1:1. Laying on bed, irritable, angry, loud/screaming, cursing at the 1:1 PCA making derogatory comments. Refuses to answer about AH, VH, HI. Denies SI. Poor insight/judgment

## 2024-04-12 NOTE — BH CONSULTATION LIAISON PROGRESS NOTE - NSBHATTESTCOMMENTATTENDFT_PSY_A_CORE
Chart reviewed, pt. seen/evaluated with the fellow, I agree with above assessment/plan. Patient alert, uncooperative, yelling/screaming and cursing, unable to engage in any meaningful interview despite several attempts. Poor insight. Plan as above. Will follow Chart reviewed, pt. seen/evaluated with the fellow, I agree with above assessment/plan. Patient alert, uncooperative, yelling/screaming and cursing, unable to engage in any meaningful interview despite several attempts. Poor insight. Plan as above. Case d/w Dr. Duran. Will follow

## 2024-04-12 NOTE — PROGRESS NOTE ADULT - SUBJECTIVE AND OBJECTIVE BOX
[facility] Division of Hospital Medicine  Helena Duran MD  Pager [phone]    Patient is a 53y old  Female who presents with a chief complaint of chest pain      SUBJECTIVE / OVERNIGHT EVENTS: pt states she was yelling before bc she was frustrated but she is ok now; of note, boyfriend just came to visit and possibly handed pt something; pt alert, not drowsy      MEDICATIONS  (STANDING):  cefepime   IVPB 2000 milliGRAM(s) IV Intermittent every 8 hours  influenza   Vaccine 0.5 milliLiter(s) IntraMuscular once  methadone    Tablet 10 milliGRAM(s) Oral daily  nicotine - 21 mG/24Hr(s) Patch 1 Patch Transdermal daily  sodium chloride 0.9%. 1000 milliLiter(s) (75 mL/Hr) IV Continuous <Continuous>  vancomycin  IVPB 1000 milliGRAM(s) IV Intermittent every 12 hours    MEDICATIONS  (PRN):  acetaminophen     Tablet .. 650 milliGRAM(s) Oral every 6 hours PRN Temp greater or equal to 38C (100.4F), Mild Pain (1 - 3)  aluminum hydroxide/magnesium hydroxide/simethicone Suspension 30 milliLiter(s) Oral every 4 hours PRN Dyspepsia  haloperidol    Injectable 5 milliGRAM(s) IntraMuscular every 8 hours PRN Agitation  LORazepam   Injectable 2 milliGRAM(s) IntraMuscular every 8 hours PRN Agitation  melatonin 3 milliGRAM(s) Oral at bedtime PRN Insomnia  ondansetron Injectable 4 milliGRAM(s) IV Push every 8 hours PRN Nausea and/or Vomiting      PHYSICAL EXAM:  Vital Signs Last 24 Hrs  T(F): 97.7 (12 Apr 2024 06:30), Max: 97.7 (12 Apr 2024 06:30)  HR: 89 (12 Apr 2024 06:30) (89 - 95)  BP: 134/72 (12 Apr 2024 06:30) (134/72 - 135/109)  RR: 18 (12 Apr 2024 06:30) (18 - 18)  SpO2: 96% (12 Apr 2024 06:30) (96% - 99%)    Parameters below as of 12 Apr 2024 06:30  Patient On (Oxygen Delivery Method): room air        CONSTITUTIONAL: NAD, appears comfortable  EYES: PERRLA; conjunctiva and sclera clear  ENMT: Moist oral mucosa; poor dentition  RESPIRATORY: Normal respiratory effort; grossly b/l AE  CARDIOVASCULAR: Regular rate and rhythm; no LE edema  ABDOMEN: Nontender to palpation, normoactive bowel sounds  MUSCULOSKELETAL:  no clubbing or cyanosis of digits; no joint swelling or tenderness to palpation  PSYCH: A+O to person, place, and time; affect appropriate  NEUROLOGY: CN 2-12 are intact and symmetric; no gross sensory deficits   SKIN: No rashes; no palpable lesions    LABS:                        13.6   7.78  )-----------( 216      ( 12 Apr 2024 11:17 )             41.2     04-12    129<L>  |  98  |  8   ----------------------------<  129<H>  TNP   |  21<L>  |  0.43<L>    Ca    8.2<L>      12 Apr 2024 11:17  Phos  2.3     04-12  Mg     2.10     04-12    TPro  7.6  /  Alb  3.1<L>  /  TBili  1.3<H>  /  DBili  x   /  AST  843<H>  /  ALT  712<H>  /  AlkPhos  138<H>  04-12        Culture - Blood (collected 10 Apr 2024 01:00)  Source: .Blood Blood-Peripheral  Preliminary Report (12 Apr 2024 07:02):    No growth at 48 Hours    Culture - Blood (collected 10 Apr 2024 00:45)  Source: .Blood Blood  Preliminary Report (12 Apr 2024 07:02):    No growth at 48 Hours    Culture - Blood (collected 10 Apr 2024 00:30)  Source: .Blood Blood-Venous  Preliminary Report (12 Apr 2024 07:02):    No growth at 48 Hours

## 2024-04-13 DIAGNOSIS — R79.89 OTHER SPECIFIED ABNORMAL FINDINGS OF BLOOD CHEMISTRY: ICD-10-CM

## 2024-04-13 DIAGNOSIS — R45.851 SUICIDAL IDEATIONS: ICD-10-CM

## 2024-04-13 LAB
ANION GAP SERPL CALC-SCNC: 11 MMOL/L — SIGNIFICANT CHANGE UP (ref 7–14)
BASOPHILS # BLD AUTO: 0.03 K/UL — SIGNIFICANT CHANGE UP (ref 0–0.2)
BASOPHILS NFR BLD AUTO: 0.5 % — SIGNIFICANT CHANGE UP (ref 0–2)
BUN SERPL-MCNC: 6 MG/DL — LOW (ref 7–23)
CALCIUM SERPL-MCNC: 8.6 MG/DL — SIGNIFICANT CHANGE UP (ref 8.4–10.5)
CHLORIDE SERPL-SCNC: 99 MMOL/L — SIGNIFICANT CHANGE UP (ref 98–107)
CO2 SERPL-SCNC: 23 MMOL/L — SIGNIFICANT CHANGE UP (ref 22–31)
CREAT SERPL-MCNC: 0.63 MG/DL — SIGNIFICANT CHANGE UP (ref 0.5–1.3)
EGFR: 107 ML/MIN/1.73M2 — SIGNIFICANT CHANGE UP
EOSINOPHIL # BLD AUTO: 0.1 K/UL — SIGNIFICANT CHANGE UP (ref 0–0.5)
EOSINOPHIL NFR BLD AUTO: 1.5 % — SIGNIFICANT CHANGE UP (ref 0–6)
GLUCOSE SERPL-MCNC: 118 MG/DL — HIGH (ref 70–99)
HCT VFR BLD CALC: 37.5 % — SIGNIFICANT CHANGE UP (ref 34.5–45)
HGB BLD-MCNC: 12.6 G/DL — SIGNIFICANT CHANGE UP (ref 11.5–15.5)
IANC: 4.18 K/UL — SIGNIFICANT CHANGE UP (ref 1.8–7.4)
IMM GRANULOCYTES NFR BLD AUTO: 0.6 % — SIGNIFICANT CHANGE UP (ref 0–0.9)
LYMPHOCYTES # BLD AUTO: 1.39 K/UL — SIGNIFICANT CHANGE UP (ref 1–3.3)
LYMPHOCYTES # BLD AUTO: 21.4 % — SIGNIFICANT CHANGE UP (ref 13–44)
MAGNESIUM SERPL-MCNC: 2.1 MG/DL — SIGNIFICANT CHANGE UP (ref 1.6–2.6)
MCHC RBC-ENTMCNC: 26.3 PG — LOW (ref 27–34)
MCHC RBC-ENTMCNC: 33.6 GM/DL — SIGNIFICANT CHANGE UP (ref 32–36)
MCV RBC AUTO: 78.1 FL — LOW (ref 80–100)
MONOCYTES # BLD AUTO: 0.75 K/UL — SIGNIFICANT CHANGE UP (ref 0–0.9)
MONOCYTES NFR BLD AUTO: 11.6 % — SIGNIFICANT CHANGE UP (ref 2–14)
NEUTROPHILS # BLD AUTO: 4.18 K/UL — SIGNIFICANT CHANGE UP (ref 1.8–7.4)
NEUTROPHILS NFR BLD AUTO: 64.4 % — SIGNIFICANT CHANGE UP (ref 43–77)
NRBC # BLD: 0 /100 WBCS — SIGNIFICANT CHANGE UP (ref 0–0)
NRBC # FLD: 0 K/UL — SIGNIFICANT CHANGE UP (ref 0–0)
PHOSPHATE SERPL-MCNC: 2.4 MG/DL — LOW (ref 2.5–4.5)
PLATELET # BLD AUTO: 220 K/UL — SIGNIFICANT CHANGE UP (ref 150–400)
POTASSIUM SERPL-MCNC: 3.9 MMOL/L — SIGNIFICANT CHANGE UP (ref 3.5–5.3)
POTASSIUM SERPL-SCNC: 3.9 MMOL/L — SIGNIFICANT CHANGE UP (ref 3.5–5.3)
RBC # BLD: 4.8 M/UL — SIGNIFICANT CHANGE UP (ref 3.8–5.2)
RBC # FLD: 16.6 % — HIGH (ref 10.3–14.5)
SODIUM SERPL-SCNC: 133 MMOL/L — LOW (ref 135–145)
VANCOMYCIN TROUGH SERPL-MCNC: <4 UG/ML — LOW (ref 10–20)
WBC # BLD: 6.49 K/UL — SIGNIFICANT CHANGE UP (ref 3.8–10.5)
WBC # FLD AUTO: 6.49 K/UL — SIGNIFICANT CHANGE UP (ref 3.8–10.5)

## 2024-04-13 PROCEDURE — 76705 ECHO EXAM OF ABDOMEN: CPT | Mod: 26

## 2024-04-13 PROCEDURE — 99233 SBSQ HOSP IP/OBS HIGH 50: CPT

## 2024-04-13 RX ORDER — OLANZAPINE 15 MG/1
5 TABLET, FILM COATED ORAL AT BEDTIME
Refills: 0 | Status: DISCONTINUED | OUTPATIENT
Start: 2024-04-13 | End: 2024-04-15

## 2024-04-13 RX ORDER — VANCOMYCIN HCL 1 G
1250 VIAL (EA) INTRAVENOUS EVERY 12 HOURS
Refills: 0 | Status: DISCONTINUED | OUTPATIENT
Start: 2024-04-14 | End: 2024-04-14

## 2024-04-13 RX ADMIN — CEFEPIME 100 MILLIGRAM(S): 1 INJECTION, POWDER, FOR SOLUTION INTRAMUSCULAR; INTRAVENOUS at 18:18

## 2024-04-13 RX ADMIN — Medication 250 MILLIGRAM(S): at 04:13

## 2024-04-13 RX ADMIN — Medication 1 PATCH: at 11:54

## 2024-04-13 RX ADMIN — METHADONE HYDROCHLORIDE 10 MILLIGRAM(S): 40 TABLET ORAL at 11:53

## 2024-04-13 RX ADMIN — Medication 250 MILLIGRAM(S): at 19:00

## 2024-04-13 RX ADMIN — CEFEPIME 100 MILLIGRAM(S): 1 INJECTION, POWDER, FOR SOLUTION INTRAMUSCULAR; INTRAVENOUS at 01:13

## 2024-04-13 NOTE — PROVIDER CONTACT NOTE (OTHER) - ASSESSMENT
Patient is A&Ox4, patient denies pain, chest pain, shortness of breath, nausea, vomiting or dizziness.
patient axo4 in no acute distress resting in bed denies cp sob. as per nursing staff at change of shift patient has refused all interventions throughout current admission
Patient A&Ox4, denies chest pain, shortness of breath, discomfort at this time.  Patient in no acute distress, resting comfortably in bed at this time.  Patient refusing AM labs
Patient in bed in no s/s of distress or pain
Patient is A&Ox4, patient denies pain, chest pain, shortness of breath, nausea, vomiting or dizziness.

## 2024-04-13 NOTE — PROVIDER CONTACT NOTE (OTHER) - RECOMMENDATIONS
ACP notified , will have patient seen by day team for assessment of capacity
As per provider Bethany Felix (#[id number]), administer Cefepime dose Im once ordered.
Patient educated on importance of daily lab draws, patient continuing to refuse.  Will reattempt to obtain labs when pt more agreeable
As per provider Aruna De Dios (#[id number]) will place an order for constant observation and notify the attending.

## 2024-04-13 NOTE — BH CONSULTATION LIAISON PROGRESS NOTE - NSBHFUPINTERVALHXFT_PSY_A_CORE
Chart reviewed, no PRN's, refused labs this morning.   Patient seen laying in bed, on 1:1. Pt states she is okay. Upset about having 1:1. states she does not have any SIIP or SIB. Denies any HIIP. No AVH or paranoia. Slept poorly due to roommate. Eating less due to not being able to use utensil. When mentioned Zyprexa, patient becomes agitated stating she does not want that. She is not Schizophrenic and will have AVH if she takes it. Took it 3 months ago and it was "bad for her"and caused her to have psychotic symptoms.

## 2024-04-13 NOTE — PROVIDER CONTACT NOTE (OTHER) - REASON
PT refusing nursing staff interventions
Patient is refusing to go to echo, patient is refusing to have vitals done and medication administered. Patient is expressing SI
RN attempted to place IV access twice as to administer antibiotic doses. Patient is refusing further IV placement attempts.
Patient refusing IV Access insertion
Pt refusing AM labs

## 2024-04-13 NOTE — PROVIDER CONTACT NOTE (OTHER) - ACTION/TREATMENT ORDERED:
As per provider Bethany Felix (#[id number]), administer Cefepime dose Im once ordered.
ACP notified , will have patient seen by day team for assessment of capacity. Patient safety maintained at this time
ACP Ade Mathias notified. Patient educated on importance of daily lab draws, patient continuing to refuse.  Will reattempt to obtain labs when pt more agreeable
As per provider Aruna De Dios (#[id number]) will place an order for constant observation and notify the attending.
Provider aware no further action warranted at this time

## 2024-04-13 NOTE — PROGRESS NOTE ADULT - SUBJECTIVE AND OBJECTIVE BOX
[facility] Division of Hospital Medicine  Helena Duran MD  Pager [phone]    Patient is a 53y old  Female who presents with a chief complaint of chest pain       SUBJECTIVE / OVERNIGHT EVENTS: pt eating fruit salad upon my arrival; pleasant, initially states she is feeling ok; then asked if I am asking about withdrawal sx to which she then states "that's terrible" however denied sweats, runny nose, diarrhea, tachy, CP; asking to inc her methadone; reports last use is on admission; pt states that sometime she doesn't even know what she is shooting sometimes it's heroin or could be tranq; pt states she has seen people die from OD in front of her, so I asked her that then she still chooses to do it, she answered with an emphatic yes      MEDICATIONS  (STANDING):  cefepime   IVPB 2000 milliGRAM(s) IV Intermittent every 8 hours  influenza   Vaccine 0.5 milliLiter(s) IntraMuscular once  methadone    Tablet 10 milliGRAM(s) Oral daily  nicotine - 21 mG/24Hr(s) Patch 1 Patch Transdermal daily  sodium chloride 0.9%. 1000 milliLiter(s) (75 mL/Hr) IV Continuous <Continuous>  vancomycin  IVPB 1000 milliGRAM(s) IV Intermittent every 12 hours    MEDICATIONS  (PRN):  acetaminophen     Tablet .. 650 milliGRAM(s) Oral every 6 hours PRN Temp greater or equal to 38C (100.4F), Mild Pain (1 - 3)  aluminum hydroxide/magnesium hydroxide/simethicone Suspension 30 milliLiter(s) Oral every 4 hours PRN Dyspepsia  haloperidol    Injectable 5 milliGRAM(s) IntraMuscular every 8 hours PRN Agitation  LORazepam   Injectable 2 milliGRAM(s) IntraMuscular every 8 hours PRN Agitation  melatonin 3 milliGRAM(s) Oral at bedtime PRN Insomnia  ondansetron Injectable 4 milliGRAM(s) IV Push every 8 hours PRN Nausea and/or Vomiting      PHYSICAL EXAM:  Vital Signs Last 24 Hrs  T(F): 97.9 (13 Apr 2024 11:34), Max: 98.8 (12 Apr 2024 18:09)  HR: 100 (13 Apr 2024 11:34) (93 - 106)  BP: 139/63 (13 Apr 2024 11:34) (103/84 - 139/63)  RR: 17 (13 Apr 2024 11:34) (17 - 18)  SpO2: 100% (13 Apr 2024 11:34) (97% - 100%)    Parameters below as of 13 Apr 2024 11:34  Patient On (Oxygen Delivery Method): room air        CONSTITUTIONAL: NAD, appears comfortable  EYES: PERRLA; conjunctiva and sclera clear  ENMT: Moist oral mucosa; poor dentition  RESPIRATORY: Normal respiratory effort; grossly b/l AE  CARDIOVASCULAR: Regular rate and rhythm; No lower extremity edema  ABDOMEN: Nontender to palpation, normoactive bowel sounds  MUSCULOSKELETAL:  no clubbing or cyanosis of digits; no joint swelling or tenderness to palpation  PSYCH: A+O to person, place, and time; affect appropriate  NEUROLOGY: CN 2-12 are intact and symmetric; no gross sensory deficits   SKIN: chronic changes from injecting b/l UE    LABS:                        13.6   7.78  )-----------( 216      ( 12 Apr 2024 11:17 )             41.2     04-12    132<L>  |  98  |  8   ----------------------------<  174<H>  3.8   |  23  |  0.46<L>    Ca    8.3<L>      12 Apr 2024 13:23  Phos  2.0     04-12  Mg     2.10     04-12    TPro  7.0  /  Alb  3.0<L>  /  TBili  1.3<H>  /  DBili  x   /  AST  755<H>  /  ALT  687<H>  /  AlkPhos  129<H>  04-12

## 2024-04-13 NOTE — BH CONSULTATION LIAISON PROGRESS NOTE - NSBHASSESSMENTFT_PSY_ALL_CORE
52 y/o F with self reported h/o Bipolar disorder and with PMH of IV drug use (heroin/ cocaine?) and asthma presents with chest pain, fevers, chills body aches for the past few days. She was getting methadone from the [facility] methadone clinic but stopped 1 week ago and has been using Heroin. Pt was seen on March 12th for cellulitis and was discharged with antibiotics. Pt admitted for ACS rule out and endocarditis work up.   CL psychiatry called for SI as the patient made a statement requesting a needle to end her life claiming she has DNR DNI at home  Patient seen in the ED, on 1:1. Evasive on exam, guarded. Received Haldol PRN earlier for agitation, perseverates on discharge, poor insight, not much forthcoming with the evaluation.   4/11: No PRN's overnight, refusing medical interventions, uncooperative on exam.     4/12: Extremely uncooperative, irritable, angry, loud/screaming, cursing at the 1:1 PCA making derogatory comments. Doesn't appear internally preoccupied.  Refuses to answer about AH, VH, HI. Denies SI.  No PRN's overnight, refusing medical interventions. Poor insight into her medical condition.     4/13: Pt initially cooperative but then became irritable. No AVH, paranoia, SIIP or HIIP. Continues to have poor insight. Start Zyprexa 5 mg PO qHs, patient can refuse.     Plan:  CW 1:1 observation for risk of elopement  Obtain Utox  SBIRT consult for substance use  START ZYPREXA 5 mg PO QHS for mood s/s if qtc <500  PRN- Haldol 5 mg IV/IM Q8 + Ativan 2 mg IV/IM Q8 for agitation if QTc <500  COWS monitoring for opiate use d/o (consider Methadone taper of 30mg day 1, then 20mg day 2, then 10mg day 3, then discontinue) if qtc <500  Continue rest of the medical workup  CL will follow, patient can not leave AMA; dispo pending  Patient does not have capacity to refuse any emergent medical interventions, labs or investigations     Care coordinated with pt.'s sister

## 2024-04-13 NOTE — PROVIDER CONTACT NOTE (OTHER) - SITUATION
patient axo4 refusing labs Iv tele monitoring ecg vitals echo test states " leave my alone I just want to sleep"
Patient AxOx4 on 1:1 SI , patient refusing IV insertion despite multiple attempts made throughout night
Patient refusing AM labs
RN attempted to place IV access twice as to administer antibiotic doses. Patient is refusing further IV placement attempts.
Patient is refusing to go to echo, patient is refusing to have vitals done and medication administered. Patient is expressing SI.

## 2024-04-14 LAB
ADD ON TEST-SPECIMEN IN LAB: SIGNIFICANT CHANGE UP
ALBUMIN SERPL ELPH-MCNC: 3.1 G/DL — LOW (ref 3.3–5)
ALP SERPL-CCNC: 196 U/L — HIGH (ref 40–120)
ALT FLD-CCNC: 815 U/L — HIGH (ref 4–33)
ANION GAP SERPL CALC-SCNC: 13 MMOL/L — SIGNIFICANT CHANGE UP (ref 7–14)
AST SERPL-CCNC: 911 U/L — HIGH (ref 4–32)
BASOPHILS # BLD AUTO: 0.04 K/UL — SIGNIFICANT CHANGE UP (ref 0–0.2)
BASOPHILS NFR BLD AUTO: 0.5 % — SIGNIFICANT CHANGE UP (ref 0–2)
BILIRUB DIRECT SERPL-MCNC: 0.7 MG/DL — HIGH (ref 0–0.3)
BILIRUB INDIRECT FLD-MCNC: 1 MG/DL — SIGNIFICANT CHANGE UP (ref 0–1)
BILIRUB SERPL-MCNC: 1.7 MG/DL — HIGH (ref 0.2–1.2)
BUN SERPL-MCNC: 5 MG/DL — LOW (ref 7–23)
CALCIUM SERPL-MCNC: 8.8 MG/DL — SIGNIFICANT CHANGE UP (ref 8.4–10.5)
CHLORIDE SERPL-SCNC: 97 MMOL/L — LOW (ref 98–107)
CK MB BLD-MCNC: 5.4 % — HIGH (ref 0–2.5)
CK MB CFR SERPL CALC: 1.4 NG/ML — SIGNIFICANT CHANGE UP
CK SERPL-CCNC: 26 U/L — SIGNIFICANT CHANGE UP (ref 25–170)
CO2 SERPL-SCNC: 20 MMOL/L — LOW (ref 22–31)
CREAT SERPL-MCNC: 0.46 MG/DL — LOW (ref 0.5–1.3)
EGFR: 116 ML/MIN/1.73M2 — SIGNIFICANT CHANGE UP
EOSINOPHIL # BLD AUTO: 0.12 K/UL — SIGNIFICANT CHANGE UP (ref 0–0.5)
EOSINOPHIL NFR BLD AUTO: 1.5 % — SIGNIFICANT CHANGE UP (ref 0–6)
GLUCOSE SERPL-MCNC: 102 MG/DL — HIGH (ref 70–99)
HAV IGM SER-ACNC: SIGNIFICANT CHANGE UP
HBV CORE IGM SER-ACNC: REACTIVE
HBV SURFACE AG SER-ACNC: REACTIVE
HCT VFR BLD CALC: 41.8 % — SIGNIFICANT CHANGE UP (ref 34.5–45)
HCV AB S/CO SERPL IA: 0.15 S/CO — SIGNIFICANT CHANGE UP (ref 0–0.99)
HCV AB SERPL-IMP: SIGNIFICANT CHANGE UP
HGB BLD-MCNC: 13.7 G/DL — SIGNIFICANT CHANGE UP (ref 11.5–15.5)
IANC: 5.29 K/UL — SIGNIFICANT CHANGE UP (ref 1.8–7.4)
IMM GRANULOCYTES NFR BLD AUTO: 0.4 % — SIGNIFICANT CHANGE UP (ref 0–0.9)
LYMPHOCYTES # BLD AUTO: 1.48 K/UL — SIGNIFICANT CHANGE UP (ref 1–3.3)
LYMPHOCYTES # BLD AUTO: 18.8 % — SIGNIFICANT CHANGE UP (ref 13–44)
MAGNESIUM SERPL-MCNC: 2.1 MG/DL — SIGNIFICANT CHANGE UP (ref 1.6–2.6)
MCHC RBC-ENTMCNC: 25.8 PG — LOW (ref 27–34)
MCHC RBC-ENTMCNC: 32.8 GM/DL — SIGNIFICANT CHANGE UP (ref 32–36)
MCV RBC AUTO: 78.6 FL — LOW (ref 80–100)
MONOCYTES # BLD AUTO: 0.91 K/UL — HIGH (ref 0–0.9)
MONOCYTES NFR BLD AUTO: 11.6 % — SIGNIFICANT CHANGE UP (ref 2–14)
NEUTROPHILS # BLD AUTO: 5.29 K/UL — SIGNIFICANT CHANGE UP (ref 1.8–7.4)
NEUTROPHILS NFR BLD AUTO: 67.2 % — SIGNIFICANT CHANGE UP (ref 43–77)
NRBC # BLD: 0 /100 WBCS — SIGNIFICANT CHANGE UP (ref 0–0)
NRBC # FLD: 0 K/UL — SIGNIFICANT CHANGE UP (ref 0–0)
PHOSPHATE SERPL-MCNC: 3.5 MG/DL — SIGNIFICANT CHANGE UP (ref 2.5–4.5)
PLATELET # BLD AUTO: 213 K/UL — SIGNIFICANT CHANGE UP (ref 150–400)
POTASSIUM SERPL-MCNC: 4.5 MMOL/L — SIGNIFICANT CHANGE UP (ref 3.5–5.3)
POTASSIUM SERPL-SCNC: 4.5 MMOL/L — SIGNIFICANT CHANGE UP (ref 3.5–5.3)
PROT SERPL-MCNC: 7.6 G/DL — SIGNIFICANT CHANGE UP (ref 6–8.3)
RBC # BLD: 5.32 M/UL — HIGH (ref 3.8–5.2)
RBC # FLD: 17.4 % — HIGH (ref 10.3–14.5)
SODIUM SERPL-SCNC: 130 MMOL/L — LOW (ref 135–145)
TROPONIN T, HIGH SENSITIVITY RESULT: <6 NG/L — SIGNIFICANT CHANGE UP
WBC # BLD: 7.87 K/UL — SIGNIFICANT CHANGE UP (ref 3.8–10.5)
WBC # FLD AUTO: 7.87 K/UL — SIGNIFICANT CHANGE UP (ref 3.8–10.5)

## 2024-04-14 PROCEDURE — 99233 SBSQ HOSP IP/OBS HIGH 50: CPT

## 2024-04-14 PROCEDURE — 93010 ELECTROCARDIOGRAM REPORT: CPT

## 2024-04-14 RX ORDER — ACETAMINOPHEN 500 MG
1000 TABLET ORAL ONCE
Refills: 0 | Status: DISCONTINUED | OUTPATIENT
Start: 2024-04-14 | End: 2024-04-14

## 2024-04-14 RX ADMIN — METHADONE HYDROCHLORIDE 10 MILLIGRAM(S): 40 TABLET ORAL at 10:44

## 2024-04-14 RX ADMIN — Medication 1 PATCH: at 10:37

## 2024-04-14 RX ADMIN — OLANZAPINE 5 MILLIGRAM(S): 15 TABLET, FILM COATED ORAL at 21:53

## 2024-04-14 RX ADMIN — Medication 1 PATCH: at 02:36

## 2024-04-14 RX ADMIN — Medication 3 MILLIGRAM(S): at 21:54

## 2024-04-14 RX ADMIN — Medication 650 MILLIGRAM(S): at 02:54

## 2024-04-14 RX ADMIN — Medication 1 PATCH: at 07:37

## 2024-04-14 RX ADMIN — Medication 650 MILLIGRAM(S): at 16:26

## 2024-04-14 RX ADMIN — Medication 1 PATCH: at 10:42

## 2024-04-14 RX ADMIN — Medication 1 PATCH: at 19:47

## 2024-04-14 RX ADMIN — Medication 650 MILLIGRAM(S): at 17:26

## 2024-04-14 NOTE — PROGRESS NOTE ADULT - SUBJECTIVE AND OBJECTIVE BOX
[facility] Division of Hospital Medicine  Helena Duran MD  Pager [phone]    Patient is a 53y old  Female who presents with a chief complaint of chest pain       SUBJECTIVE / OVERNIGHT EVENTS: pt currently without IV access; denies abd pain, n/v; d/w pt rising LFTs, hep panel not resulted at the time I saw pt was d/w pt as this was a possibility of her LFTs; eating breakfast upon my arrival      MEDICATIONS  (STANDING):  influenza   Vaccine 0.5 milliLiter(s) IntraMuscular once  methadone    Tablet 10 milliGRAM(s) Oral daily  nicotine - 21 mG/24Hr(s) Patch 1 Patch Transdermal daily  OLANZapine 5 milliGRAM(s) Oral at bedtime    MEDICATIONS  (PRN):  acetaminophen     Tablet .. 650 milliGRAM(s) Oral every 6 hours PRN Temp greater or equal to 38C (100.4F), Mild Pain (1 - 3)  aluminum hydroxide/magnesium hydroxide/simethicone Suspension 30 milliLiter(s) Oral every 4 hours PRN Dyspepsia  haloperidol    Injectable 5 milliGRAM(s) IntraMuscular every 8 hours PRN Agitation  LORazepam   Injectable 2 milliGRAM(s) IntraMuscular every 8 hours PRN Agitation  melatonin 3 milliGRAM(s) Oral at bedtime PRN Insomnia  ondansetron Injectable 4 milliGRAM(s) IV Push every 8 hours PRN Nausea and/or Vomiting    PHYSICAL EXAM:  Vital Signs Last 24 Hrs  T(F): 98.4 (13 Apr 2024 21:02), Max: 98.4 (13 Apr 2024 21:02)  HR: 99 (13 Apr 2024 21:02) (99 - 100)  BP: 154/65 (13 Apr 2024 21:02) (139/63 - 154/65)  RR: 17 (13 Apr 2024 21:02) (17 - 17)  SpO2: 99% (13 Apr 2024 21:02) (99% - 100%)    Parameters below as of 13 Apr 2024 21:02  Patient On (Oxygen Delivery Method): room air        CONSTITUTIONAL: NAD, appears comfortable  EYES: PERRLA; conjunctiva and sclera clear  ENMT: Moist oral mucosa; poor dentition  RESPIRATORY: Normal respiratory effort; lungs are clear to auscultation bilaterally  CARDIOVASCULAR: Regular rate and rhythm; No lower extremity edema  ABDOMEN: Nontender to palpation, normoactive bowel sounds  MUSCULOSKELETAL:  no clubbing or cyanosis of digits; no joint swelling or tenderness to palpation  PSYCH: A+O to person, place, and time; affect appropriate  NEUROLOGY: CN 2-12 are intact and symmetric; no gross sensory deficits   SKIN: No rashes; no palpable lesions    LABS:                        13.7   7.87  )-----------( 213      ( 14 Apr 2024 05:20 )             41.8     04-14    130<L>  |  97<L>  |  5<L>  ----------------------------<  102<H>  4.5   |  20<L>  |  0.46<L>    Ca    8.8      14 Apr 2024 05:20  Phos  3.5     04-14  Mg     2.10     04-14    TPro  7.6  /  Alb  3.1<L>  /  TBili  1.7<H>  /  DBili  0.7<H>  /  AST  911<H>  /  ALT  815<H>  /  AlkPhos  196<H>  04-14      CARDIAC MARKERS ( 14 Apr 2024 05:20 )  x     / x     / 26 U/L / x     / 1.4 ng/mL

## 2024-04-14 NOTE — CONSULT NOTE ADULT - SUBJECTIVE AND OBJECTIVE BOX
Chief Complaint:  chest pain    HPI:    Ms. Peng is a 53 year old female with heroin use disorder, nicotine use disorder, occasional crack cocaine use, bipolar disorder and asthma who presented with chest pain, subjective fevers, chills, and body aches for the past few days. She was getting methadone from the [facility] methad[facility] but stopped 1 week ago and has been using heroin. Pt was seen on March 12th for cellulitis of injection site at left AC fossa and was discharged with antibiotics (Bactrim) which she reports taking. Pt denies vomiting, nausea, diarrhea, joint back or pain.     Upon admission, ACS work-up is negative. Patient afebrile. Blood cultures negative. Labs are notable for hyponatremia and elevated liver enzymes with overall up-trend to , , , T Bili 1.7. RUQ unremarkable. Patient denies any known personal or family history of liver disease. She used to drink socially, last > 10 years ago. Denies recent travel or history of incarceration. No jaundice, abdominal swelling or confusion. Only recent medication was Bactrim for skin infection.     Allergies:  No Known Allergies    Hospital Medications:  acetaminophen     Tablet .. 650 milliGRAM(s) Oral every 6 hours PRN  aluminum hydroxide/magnesium hydroxide/simethicone Suspension 30 milliLiter(s) Oral every 4 hours PRN  haloperidol    Injectable 5 milliGRAM(s) IntraMuscular every 8 hours PRN  influenza   Vaccine 0.5 milliLiter(s) IntraMuscular once  LORazepam   Injectable 2 milliGRAM(s) IntraMuscular every 8 hours PRN  melatonin 3 milliGRAM(s) Oral at bedtime PRN  methadone    Tablet 10 milliGRAM(s) Oral daily  nicotine - 21 mG/24Hr(s) Patch 1 Patch Transdermal daily  OLANZapine 5 milliGRAM(s) Oral at bedtime  ondansetron Injectable 4 milliGRAM(s) IV Push every 8 hours PRN      PMHX/PSHX:  RA (rheumatoid arthritis)    Opioid use disorder, moderate, in early remission, on maintenance therapy, dependence    Bipolar disorder    Opioid use disorder    No significant past surgical history    Ankle fracture, right    Family history:  No pertinent family history in first degree relatives    Social History: smoking    ROS:   See HPI    PHYSICAL EXAM:   GENERAL:  NAD, resting comfortably in bed  HEENT:  Sclera anicteric  CHEST:  Normal effort  HEART:  HDS  ABDOMEN:  Soft, non-tender, non-distended  EXTREMITIES:  No edema  SKIN:  Warm & Dry. No jaundice. Track marks noted.  NEURO:  Alert, conversant, no focal deficit. No asterixis     Vital Signs:  Vital Signs Last 24 Hrs  T(C): 36.9 (13 Apr 2024 21:02), Max: 36.9 (13 Apr 2024 21:02)  T(F): 98.4 (13 Apr 2024 21:02), Max: 98.4 (13 Apr 2024 21:02)  HR: 99 (13 Apr 2024 21:02) (99 - 99)  BP: 154/65 (13 Apr 2024 21:02) (154/65 - 154/65)  BP(mean): --  RR: 17 (13 Apr 2024 21:02) (17 - 17)  SpO2: 99% (13 Apr 2024 21:02) (99% - 99%)    Parameters below as of 13 Apr 2024 21:02  Patient On (Oxygen Delivery Method): room air     Daily     LABS:                        13.7   7.87  )-----------( 213      ( 14 Apr 2024 05:20 )             41.8     Mean Cell Volume: 78.6 fL (04-14-24 @ 05:20)    04-14    130<L>  |  97<L>  |  5<L>  ----------------------------<  102<H>  4.5   |  20<L>  |  0.46<L>    Ca    8.8      14 Apr 2024 05:20  Phos  3.5     04-14  Mg     2.10     04-14    TPro  7.6  /  Alb  3.1<L>  /  TBili  1.7<H>  /  DBili  0.7<H>  /  AST  911<H>  /  ALT  815<H>  /  AlkPhos  196<H>  04-14    LIVER FUNCTIONS - ( 14 Apr 2024 05:20 )  Alb: 3.1 g/dL / Pro: 7.6 g/dL / ALK PHOS: 196 U/L / ALT: 815 U/L / AST: 911 U/L / GGT: x             Urinalysis Basic - ( 14 Apr 2024 05:20 )    Color: x / Appearance: x / SG: x / pH: x  Gluc: 102 mg/dL / Ketone: x  / Bili: x / Urobili: x   Blood: x / Protein: x / Nitrite: x   Leuk Esterase: x / RBC: x / WBC x   Sq Epi: x / Non Sq Epi: x / Bacteria: x                              13.7   7.87  )-----------( 213      ( 14 Apr 2024 05:20 )             41.8                         12.6   6.49  )-----------( 220      ( 13 Apr 2024 17:31 )             37.5                         13.6   7.78  )-----------( 216      ( 12 Apr 2024 11:17 )             41.2     Imaging:    < from: US Abdomen Upper Quadrant Right (04.13.24 @ 17:06) >  FINDINGS:  Liver: Visualized portions appear unremarkable.  Bile ducts: Normal caliber. Common bile duct measures 2 mm.  Gallbladder: Contracted gallbladder limiting assessment. Possible   gallbladder polyps versus adenomyomatosis. Negative sonographic Choi's   sign.  Pancreas: Poorly visualized.  Right kidney: 11.4 cm. No hydronephrosis.  Ascites: None.      IMPRESSION:    Contracted gallbladder limiting assessment. Multiple gallbladder polyps   measuring up to 0.4 cm, limited evaluation in contracted gallbladder.   Recommend outpatient sonographic follow-up.    No sonographic evidence of acute cholecystitis.    < end of copied text >

## 2024-04-14 NOTE — CHART NOTE - NSCHARTNOTEFT_GEN_A_CORE
Notified by RN pt c/o chest pain. Chest pain is the same as pain on admission, likely musculoskeletal. 54 y/o F with PMH of IV drug use (heroin), bipolar disorder and asthma presents with chest pain, fevers, chills body aches for the past few days. Pt admitted for ACS rule out and endocarditis work up.     Chest pain, likely muskuloskeletal, r/o ACS  - resolved without intervention  - EKG: NSR unchanged from previous, no ischemic changes   - f/u Shona  - PRN tylenol PO

## 2024-04-14 NOTE — CONSULT NOTE ADULT - ASSESSMENT
Ms. Peng is a 53 year old female with heroin use disorder, nicotine use disorder, occasional crack cocaine use, bipolar disorder and asthma who presented with chest pain, subjective fevers, chills, and body aches, admitted for cardiac and infectious work-up. Hospital course is complicated by abnormal liver chemistry.     #Acute liver injury  #Acute Hepatitis B infection  #Polysubstance use disorder  #Intravenous drug use   Liver chemistry is abnormal in a predominant hepatocellular pattern of injury. Prior labs were c/w mild AST elevation (35) only. On admission, , ,  and T Bili 1.2. Chemistry is up-trending to , , , T Bili 1.7. She has no known history of liver disease. Acute viral hepatitis panel is notable for reactive Hep B core IgM and Hep B surface Ag, concern for acute Hepatitis B infection. Hep A IgM and HCV Ab are negative. No regular alcohol use; presentation is not c/w alcoholic hepatitis. RUQ US does not have findings c/w cirrhosis, portal hypertension or acute biliary pathology. PLT normal. Infectious work-up is otherwise negative. Echo with normal RV function. Less likely AIH or DILI.     Recommendations:  - Check HBV DNA, Hep B eAg , Hep B eAb, Hep B core IgG  - Check PT/INR  - Trend CBC, CMP and clinical symptoms  - No current antiviral therapy     Recommendations preliminary until signed by attending.     Dl Guan MD  Gastroenterology/Hepatology Fellow  Available via [employer] Teams  Pager: [phone]    On Weekdays after 5 PM to 8AM and Weekends/Holidays (All day)  For non-urgent consults, please email [email or web address] or [email or web address]  For urgent consults, please contact on call GI team.  See Amion schedule ([facility]), [employer] system - [phone] ([facility]), or call hospital  ([facility]) Ms. Peng is a 53 year old female with heroin use disorder, nicotine use disorder, occasional crack cocaine use, bipolar disorder and asthma who presented with chest pain, subjective fevers, chills, and body aches, admitted for cardiac and infectious work-up. Hospital course is complicated by abnormal liver chemistry.     #Acute Hepatitis B infection  #Acute liver injury  #Polysubstance use disorder  #Intravenous drug use   Patient presented with an acute febrile syndrome and found to have abnormal liver chemistry in a predominant hepatocellular pattern of injury. Prior labs were c/w mild AST elevation (35) only. On admission, , ,  and T Bili 1.2. Chemistry is up-trending to , , , T Bili 1.7. Acute viral hepatitis panel is notable for reactive Hep B core IgM and Hep B surface Ag, concern for acute Hepatitis B infection. Hep A IgM and HCV Ab are negative. No regular alcohol use; presentation is not c/w alcoholic hepatitis. She has no known history of liver disease.  RUQ US does not have findings c/w cirrhosis, portal hypertension or acute biliary pathology. PLT normal. No signs of cirrhosis or acute liver failure. Infectious work-up is otherwise negative. Echo with normal RV function. Less likely AIH or DILI.     Recommendations:  - Check HBV DNA, Hep B eAg , Hep B eAb, Hep B core IgG and HIV testing  - Check PT/INR   - Trend CBC, CMP, PT/INR and clinical symptoms  - No role for antiviral therapy at this time  - Patient will require f/u in Hepatology clinic for long-term monitoring     Case discussed with Hepatology attending.     Dl Guan MD  Gastroenterology/Hepatology Fellow  Available via [employer] Teams  Pager: [phone]    On Weekdays after 5 PM to 8AM and Weekends/Holidays (All day)  For non-urgent consults, please email [email or web address] or [email or web address]  For urgent consults, please contact on call GI team.  See Amion schedule ([facility]), [employer] system - [phone] ([facility]), or call hospital  ([facility])

## 2024-04-15 DIAGNOSIS — B19.10 UNSPECIFIED VIRAL HEPATITIS B WITHOUT HEPATIC COMA: ICD-10-CM

## 2024-04-15 LAB
ALBUMIN SERPL ELPH-MCNC: 3.3 G/DL — SIGNIFICANT CHANGE UP (ref 3.3–5)
ALP SERPL-CCNC: 199 U/L — HIGH (ref 40–120)
ALT FLD-CCNC: 905 U/L — HIGH (ref 4–33)
ANION GAP SERPL CALC-SCNC: 12 MMOL/L — SIGNIFICANT CHANGE UP (ref 7–14)
APTT BLD: 34.5 SEC — SIGNIFICANT CHANGE UP (ref 24.5–35.6)
AST SERPL-CCNC: 777 U/L — HIGH (ref 4–32)
BASOPHILS # BLD AUTO: 0.05 K/UL — SIGNIFICANT CHANGE UP (ref 0–0.2)
BASOPHILS NFR BLD AUTO: 0.6 % — SIGNIFICANT CHANGE UP (ref 0–2)
BILIRUB SERPL-MCNC: 1.4 MG/DL — HIGH (ref 0.2–1.2)
BUN SERPL-MCNC: 9 MG/DL — SIGNIFICANT CHANGE UP (ref 7–23)
CALCIUM SERPL-MCNC: 9.1 MG/DL — SIGNIFICANT CHANGE UP (ref 8.4–10.5)
CHLORIDE SERPL-SCNC: 98 MMOL/L — SIGNIFICANT CHANGE UP (ref 98–107)
CO2 SERPL-SCNC: 23 MMOL/L — SIGNIFICANT CHANGE UP (ref 22–31)
CREAT SERPL-MCNC: 0.63 MG/DL — SIGNIFICANT CHANGE UP (ref 0.5–1.3)
CULTURE RESULTS: SIGNIFICANT CHANGE UP
EGFR: 107 ML/MIN/1.73M2 — SIGNIFICANT CHANGE UP
EOSINOPHIL # BLD AUTO: 0.16 K/UL — SIGNIFICANT CHANGE UP (ref 0–0.5)
EOSINOPHIL NFR BLD AUTO: 1.9 % — SIGNIFICANT CHANGE UP (ref 0–6)
GLUCOSE SERPL-MCNC: 153 MG/DL — HIGH (ref 70–99)
HCT VFR BLD CALC: 40.4 % — SIGNIFICANT CHANGE UP (ref 34.5–45)
HGB BLD-MCNC: 12.7 G/DL — SIGNIFICANT CHANGE UP (ref 11.5–15.5)
HIV 1+2 AB+HIV1 P24 AG SERPL QL IA: SIGNIFICANT CHANGE UP
IANC: 5.66 K/UL — SIGNIFICANT CHANGE UP (ref 1.8–7.4)
IMM GRANULOCYTES NFR BLD AUTO: 0.6 % — SIGNIFICANT CHANGE UP (ref 0–0.9)
INR BLD: 1.18 RATIO — SIGNIFICANT CHANGE UP (ref 0.85–1.18)
LYMPHOCYTES # BLD AUTO: 1.78 K/UL — SIGNIFICANT CHANGE UP (ref 1–3.3)
LYMPHOCYTES # BLD AUTO: 20.8 % — SIGNIFICANT CHANGE UP (ref 13–44)
MAGNESIUM SERPL-MCNC: 2 MG/DL — SIGNIFICANT CHANGE UP (ref 1.6–2.6)
MCHC RBC-ENTMCNC: 25.2 PG — LOW (ref 27–34)
MCHC RBC-ENTMCNC: 31.4 GM/DL — LOW (ref 32–36)
MCV RBC AUTO: 80.2 FL — SIGNIFICANT CHANGE UP (ref 80–100)
MONOCYTES # BLD AUTO: 0.85 K/UL — SIGNIFICANT CHANGE UP (ref 0–0.9)
MONOCYTES NFR BLD AUTO: 9.9 % — SIGNIFICANT CHANGE UP (ref 2–14)
NEUTROPHILS # BLD AUTO: 5.66 K/UL — SIGNIFICANT CHANGE UP (ref 1.8–7.4)
NEUTROPHILS NFR BLD AUTO: 66.2 % — SIGNIFICANT CHANGE UP (ref 43–77)
NRBC # BLD: 0 /100 WBCS — SIGNIFICANT CHANGE UP (ref 0–0)
NRBC # FLD: 0 K/UL — SIGNIFICANT CHANGE UP (ref 0–0)
PHOSPHATE SERPL-MCNC: 3.3 MG/DL — SIGNIFICANT CHANGE UP (ref 2.5–4.5)
PLATELET # BLD AUTO: 205 K/UL — SIGNIFICANT CHANGE UP (ref 150–400)
POTASSIUM SERPL-MCNC: 4.2 MMOL/L — SIGNIFICANT CHANGE UP (ref 3.5–5.3)
POTASSIUM SERPL-SCNC: 4.2 MMOL/L — SIGNIFICANT CHANGE UP (ref 3.5–5.3)
PROT SERPL-MCNC: 7.6 G/DL — SIGNIFICANT CHANGE UP (ref 6–8.3)
PROTHROM AB SERPL-ACNC: 13.1 SEC — HIGH (ref 9.5–13)
RBC # BLD: 5.04 M/UL — SIGNIFICANT CHANGE UP (ref 3.8–5.2)
RBC # FLD: 17.6 % — HIGH (ref 10.3–14.5)
SODIUM SERPL-SCNC: 133 MMOL/L — LOW (ref 135–145)
SPECIMEN SOURCE: SIGNIFICANT CHANGE UP
WBC # BLD: 8.55 K/UL — SIGNIFICANT CHANGE UP (ref 3.8–10.5)
WBC # FLD AUTO: 8.55 K/UL — SIGNIFICANT CHANGE UP (ref 3.8–10.5)

## 2024-04-15 PROCEDURE — 99232 SBSQ HOSP IP/OBS MODERATE 35: CPT | Mod: GC

## 2024-04-15 PROCEDURE — 99232 SBSQ HOSP IP/OBS MODERATE 35: CPT

## 2024-04-15 PROCEDURE — 99233 SBSQ HOSP IP/OBS HIGH 50: CPT

## 2024-04-15 RX ORDER — QUETIAPINE FUMARATE 200 MG/1
50 TABLET, FILM COATED ORAL AT BEDTIME
Refills: 0 | Status: DISCONTINUED | OUTPATIENT
Start: 2024-04-15 | End: 2024-04-16

## 2024-04-15 RX ORDER — METHADONE HYDROCHLORIDE 40 MG/1
10 TABLET ORAL ONCE
Refills: 0 | Status: DISCONTINUED | OUTPATIENT
Start: 2024-04-15 | End: 2024-04-15

## 2024-04-15 RX ORDER — METHADONE HYDROCHLORIDE 40 MG/1
10 TABLET ORAL DAILY
Refills: 0 | Status: DISCONTINUED | OUTPATIENT
Start: 2024-04-16 | End: 2024-04-16

## 2024-04-15 RX ADMIN — METHADONE HYDROCHLORIDE 10 MILLIGRAM(S): 40 TABLET ORAL at 17:12

## 2024-04-15 RX ADMIN — Medication 1 PATCH: at 12:46

## 2024-04-15 RX ADMIN — Medication 1 PATCH: at 08:00

## 2024-04-15 RX ADMIN — Medication 1 PATCH: at 12:41

## 2024-04-15 RX ADMIN — HALOPERIDOL DECANOATE 5 MILLIGRAM(S): 100 INJECTION INTRAMUSCULAR at 12:41

## 2024-04-15 NOTE — BH CONSULTATION LIAISON PROGRESS NOTE - NSBHCHARTREVIEWLAB_PSY_A_CORE FT
12.0   8.59  )-----------( 180      ( 10 Apr 2024 06:00 )             37.4     04-10    130<L>  |  101  |  5<L>  ----------------------------<  119<H>  3.9   |  20<L>  |  0.50    Ca    8.3<L>      10 Apr 2024 06:00  Phos  2.8     04-10  Mg     2.00     04-10    TPro  TNP  /  Alb  2.8<L>  /  TBili  1.3<H>  /  DBili  x   /  AST  TNP  /  ALT  TNP  /  AlkPhos  118  04-10    Urinalysis Basic - ( 10 Apr 2024 06:00 )    Color: x / Appearance: x / SG: x / pH: x  Gluc: 119 mg/dL / Ketone: x  / Bili: x / Urobili: x   Blood: x / Protein: x / Nitrite: x   Leuk Esterase: x / RBC: x / WBC x   Sq Epi: x / Non Sq Epi: x / Bacteria: x    
                      13.7   7.87  )-----------( 213      ( 14 Apr 2024 05:20 )             41.8   04-14    130<L>  |  97<L>  |  5<L>  ----------------------------<  102<H>  4.5   |  20<L>  |  0.46<L>    Ca    8.8      14 Apr 2024 05:20  Phos  3.5     04-14  Mg     2.10     04-14    TPro  7.6  /  Alb  3.1<L>  /  TBili  1.7<H>  /  DBili  0.7<H>  /  AST  911<H>  /  ALT  815<H>  /  AlkPhos  196<H>  04-14  
Refused labs
Refused labs

## 2024-04-15 NOTE — BH CONSULTATION LIAISON PROGRESS NOTE - NSICDXBHSECONDARYDX_PSY_ALL_CORE
Heroin dependence   F11.20  Hyponatremia   E87.1  Prophylactic measure   Z29.9  Chest pain   R07.9  Suicidal ideation   R45.851  Elevated LFTs   R79.89  Hepatitis B virus infection, unspecified chronicity   B19.10

## 2024-04-15 NOTE — PROGRESS NOTE ADULT - SUBJECTIVE AND OBJECTIVE BOX
[facility] Division of Hospital Medicine  Dl Najera MD  In House Pager [phone]    Patient is a 53y old  Female who presents with a chief complaint of chest pain (15 Apr 2024 10:44)    OVERNIGHT EVENTS: no acute events.   SUBJECTIVE: no new subjective symptoms. reports pain everywhere. feels tired from zyprexa.   ROS: Denied Fever, Chill, CP, SOB, Abd pain, N/V/D, LE swelling or pain.     MEDICATIONS  (STANDING):  influenza   Vaccine 0.5 milliLiter(s) IntraMuscular once  methadone    Tablet 10 milliGRAM(s) Oral daily  nicotine - 21 mG/24Hr(s) Patch 1 Patch Transdermal daily  QUEtiapine 50 milliGRAM(s) Oral at bedtime    MEDICATIONS  (PRN):  acetaminophen     Tablet .. 650 milliGRAM(s) Oral every 6 hours PRN Temp greater or equal to 38C (100.4F), Mild Pain (1 - 3)  aluminum hydroxide/magnesium hydroxide/simethicone Suspension 30 milliLiter(s) Oral every 4 hours PRN Dyspepsia  haloperidol    Injectable 5 milliGRAM(s) IntraMuscular every 8 hours PRN Agitation  LORazepam   Injectable 2 milliGRAM(s) IntraMuscular every 8 hours PRN Agitation  melatonin 3 milliGRAM(s) Oral at bedtime PRN Insomnia  ondansetron Injectable 4 milliGRAM(s) IV Push every 8 hours PRN Nausea and/or Vomiting    CAPILLARY BLOOD GLUCOSE        I&O's Summary      Vital Signs Last 24 Hrs  T(C): 36.5 (15 Apr 2024 04:51), Max: 36.7 (14 Apr 2024 14:00)  T(F): 97.7 (15 Apr 2024 04:51), Max: 98.1 (14 Apr 2024 14:00)  HR: 86 (15 Apr 2024 04:51) (86 - 106)  BP: 154/91 (15 Apr 2024 04:51) (137/82 - 154/91)  BP(mean): --  RR: 18 (15 Apr 2024 04:51) (17 - 18)  SpO2: 96% (15 Apr 2024 04:51) (96% - 100%)    Parameters below as of 15 Apr 2024 04:51  Patient On (Oxygen Delivery Method): room air        LABS:                        13.7   7.87  )-----------( 213      ( 14 Apr 2024 05:20 )             41.8     04-14    130<L>  |  97<L>  |  5<L>  ----------------------------<  102<H>  4.5   |  20<L>  |  0.46<L>    Ca    8.8      14 Apr 2024 05:20  Phos  3.5     04-14  Mg     2.10     04-14    TPro  7.6  /  Alb  3.1<L>  /  TBili  1.7<H>  /  DBili  0.7<H>  /  AST  911<H>  /  ALT  815<H>  /  AlkPhos  196<H>  04-14      CARDIAC MARKERS ( 14 Apr 2024 05:20 )  x     / x     / 26 U/L / x     / 1.4 ng/mL      Urinalysis Basic - ( 14 Apr 2024 05:20 )    Color: x / Appearance: x / SG: x / pH: x  Gluc: 102 mg/dL / Ketone: x  / Bili: x / Urobili: x   Blood: x / Protein: x / Nitrite: x   Leuk Esterase: x / RBC: x / WBC x   Sq Epi: x / Non Sq Epi: x / Bacteria: x        RADIOLOGY & ADDITIONAL TESTS:  Results Reviewed: Y  Imaging Personally Reviewed: Y  Electrocardiogram Personally Reviewed: Y    COORDINATION OF CARE:  Care Discussed with Consultants/Other Providers [Y/N]: Y  Prior or Outpatient Records Reviewed [Y/N]: Y   [facility] Division of Hospital Medicine  Dl Najera MD  In House Pager [phone]    Patient is a 53y old  Female who presents with a chief complaint of chest pain (15 Apr 2024 10:44)    OVERNIGHT EVENTS: no acute events.   SUBJECTIVE: no new subjective symptoms. reports pain everywhere. feels tired from zyprexa. decline physical exam.  ROS: Denied Fever, Chill, CP, SOB, Abd pain, N/V/D, LE swelling or pain.     MEDICATIONS  (STANDING):  influenza   Vaccine 0.5 milliLiter(s) IntraMuscular once  methadone    Tablet 10 milliGRAM(s) Oral daily  nicotine - 21 mG/24Hr(s) Patch 1 Patch Transdermal daily  QUEtiapine 50 milliGRAM(s) Oral at bedtime    MEDICATIONS  (PRN):  acetaminophen     Tablet .. 650 milliGRAM(s) Oral every 6 hours PRN Temp greater or equal to 38C (100.4F), Mild Pain (1 - 3)  aluminum hydroxide/magnesium hydroxide/simethicone Suspension 30 milliLiter(s) Oral every 4 hours PRN Dyspepsia  haloperidol    Injectable 5 milliGRAM(s) IntraMuscular every 8 hours PRN Agitation  LORazepam   Injectable 2 milliGRAM(s) IntraMuscular every 8 hours PRN Agitation  melatonin 3 milliGRAM(s) Oral at bedtime PRN Insomnia  ondansetron Injectable 4 milliGRAM(s) IV Push every 8 hours PRN Nausea and/or Vomiting    CAPILLARY BLOOD GLUCOSE        I&O's Summary      Vital Signs Last 24 Hrs  T(C): 36.5 (15 Apr 2024 04:51), Max: 36.7 (14 Apr 2024 14:00)  T(F): 97.7 (15 Apr 2024 04:51), Max: 98.1 (14 Apr 2024 14:00)  HR: 86 (15 Apr 2024 04:51) (86 - 106)  BP: 154/91 (15 Apr 2024 04:51) (137/82 - 154/91)  BP(mean): --  RR: 18 (15 Apr 2024 04:51) (17 - 18)  SpO2: 96% (15 Apr 2024 04:51) (96% - 100%)    Parameters below as of 15 Apr 2024 04:51  Patient On (Oxygen Delivery Method): room air    Physical Exam:  declining physical exam.   GENERAL: no apparent distress; on RA;         LABS:                        13.7   7.87  )-----------( 213      ( 14 Apr 2024 05:20 )             41.8     04-14    130<L>  |  97<L>  |  5<L>  ----------------------------<  102<H>  4.5   |  20<L>  |  0.46<L>    Ca    8.8      14 Apr 2024 05:20  Phos  3.5     04-14  Mg     2.10     04-14    TPro  7.6  /  Alb  3.1<L>  /  TBili  1.7<H>  /  DBili  0.7<H>  /  AST  911<H>  /  ALT  815<H>  /  AlkPhos  196<H>  04-14      CARDIAC MARKERS ( 14 Apr 2024 05:20 )  x     / x     / 26 U/L / x     / 1.4 ng/mL      Urinalysis Basic - ( 14 Apr 2024 05:20 )    Color: x / Appearance: x / SG: x / pH: x  Gluc: 102 mg/dL / Ketone: x  / Bili: x / Urobili: x   Blood: x / Protein: x / Nitrite: x   Leuk Esterase: x / RBC: x / WBC x   Sq Epi: x / Non Sq Epi: x / Bacteria: x        RADIOLOGY & ADDITIONAL TESTS:  Results Reviewed: Y  Imaging Personally Reviewed: Y  Electrocardiogram Personally Reviewed: Y    COORDINATION OF CARE:  Care Discussed with Consultants/Other Providers [Y/N]: Y  Prior or Outpatient Records Reviewed [Y/N]: TARA

## 2024-04-15 NOTE — PROGRESS NOTE ADULT - SUBJECTIVE AND OBJECTIVE BOX
Interval Events:   No acute events. Patient anxious this AM, otherwise feeling okay  VSS. Labs pending    Hospital Medications:  acetaminophen     Tablet .. 650 milliGRAM(s) Oral every 6 hours PRN  aluminum hydroxide/magnesium hydroxide/simethicone Suspension 30 milliLiter(s) Oral every 4 hours PRN  haloperidol    Injectable 5 milliGRAM(s) IntraMuscular every 8 hours PRN  influenza   Vaccine 0.5 milliLiter(s) IntraMuscular once  LORazepam   Injectable 2 milliGRAM(s) IntraMuscular every 8 hours PRN  melatonin 3 milliGRAM(s) Oral at bedtime PRN  methadone    Tablet 10 milliGRAM(s) Oral daily  nicotine - 21 mG/24Hr(s) Patch 1 Patch Transdermal daily  OLANZapine 5 milliGRAM(s) Oral at bedtime  ondansetron Injectable 4 milliGRAM(s) IV Push every 8 hours PRN      ROS: See above.    PHYSICAL EXAM:   Vital Signs:  Vital Signs Last 24 Hrs  T(C): 36.5 (15 Apr 2024 04:51), Max: 36.7 (14 Apr 2024 14:00)  T(F): 97.7 (15 Apr 2024 04:51), Max: 98.1 (14 Apr 2024 14:00)  HR: 86 (15 Apr 2024 04:51) (86 - 106)  BP: 154/91 (15 Apr 2024 04:51) (137/82 - 154/91)  BP(mean): --  RR: 18 (15 Apr 2024 04:51) (17 - 18)  SpO2: 96% (15 Apr 2024 04:51) (96% - 100%)    Parameters below as of 15 Apr 2024 04:51  Patient On (Oxygen Delivery Method): room air      Daily     Daily     GENERAL:  NAD, resting comfortably in bed  HEENT:  sclera anicteric  CHEST:  Normal Effort  HEART:  HDS  ABDOMEN:  Soft, non-tender, non-distended  SKIN:  Warm & Dry. No jaundice  NEURO:  Alert, conversant, no focal deficit    LABS:                        13.7   7.87  )-----------( 213      ( 14 Apr 2024 05:20 )             41.8       04-14    130<L>  |  97<L>  |  5<L>  ----------------------------<  102<H>  4.5   |  20<L>  |  0.46<L>    Ca    8.8      14 Apr 2024 05:20  Phos  3.5     04-14  Mg     2.10     04-14    TPro  7.6  /  Alb  3.1<L>  /  TBili  1.7<H>  /  DBili  0.7<H>  /  AST  911<H>  /  ALT  815<H>  /  AlkPhos  196<H>  04-14    LIVER FUNCTIONS - ( 14 Apr 2024 05:20 )  Alb: 3.1 g/dL / Pro: 7.6 g/dL / ALK PHOS: 196 U/L / ALT: 815 U/L / AST: 911 U/L / GGT: x             Urinalysis Basic - ( 14 Apr 2024 05:20 )    Color: x / Appearance: x / SG: x / pH: x  Gluc: 102 mg/dL / Ketone: x  / Bili: x / Urobili: x   Blood: x / Protein: x / Nitrite: x   Leuk Esterase: x / RBC: x / WBC x   Sq Epi: x / Non Sq Epi: x / Bacteria: x                              13.7   7.87  )-----------( 213      ( 14 Apr 2024 05:20 )             41.8                         12.6   6.49  )-----------( 220      ( 13 Apr 2024 17:31 )             37.5                         13.6   7.78  )-----------( 216      ( 12 Apr 2024 11:17 )             41.2

## 2024-04-15 NOTE — BH CONSULTATION LIAISON PROGRESS NOTE - NSBHASSESSMENTFT_PSY_ALL_CORE
54 y/o F with self reported h/o Bipolar disorder and with PMH of IV drug use (heroin/ cocaine?) and asthma presents with chest pain, fevers, chills body aches for the past few days. She was getting methadone from the [facility] methadone clinic but stopped 1 week ago and has been using Heroin. Pt was seen on March 12th for cellulitis and was discharged with antibiotics. Pt admitted for ACS rule out and endocarditis work up, Echo unremarkable. Found to have worsening transaminitis and tested Hep B core IgM and Hep B surface antigen +, with no signs of cirrhosis. Hepatitis work up continues.   psychiatry called for SI as the patient made a statement requesting a needle to end her life claiming she has DNR DNI at home  Patient seen in the ED, on 1:1. Evasive on exam, guarded. Received Haldol PRN earlier for agitation, perseverates on discharge, poor insight, not much forthcoming with the evaluation.   4/11: No PRN's overnight, refusing medical interventions, uncooperative on exam.     4/12: Extremely uncooperative, irritable, angry, loud/screaming, cursing at the 1:1 PCA making derogatory comments. Doesn't appear internally preoccupied.  Refuses to answer about AH, VH, HI. Denies SI.  No PRN's overnight, refusing medical interventions. Poor insight into her medical condition.     4/13: Pt initially cooperative but then became irritable. No AVH, paranoia, SIIP or HIIP. Continues to have poor insight. Start Zyprexa 5 mg PO qHs, patient can refuse.     4/15: Pt irritable throughout interview, attributes emotional and somatic symptoms as reactions to Zyprexa. Pt makes inflammatory statements but is redirectable. Pt describes distressing social issues including housing insecurity, lack of social support and concern of access to methadone treatment at [facility] upon discharge. Pt denies SI at this time, claims she makes such statements at times when she is feeling angry, upset, overwhelmed.     Plan:    #Agitation  - D/C ZYPREXA 5 mg PO QHS  - START QUETIAPINE 50 mg PO QHS  - PRN- Haldol 5 mg IV/IM Q8 + Ativan 2 mg IV/IM Q8 for agitation if QTc <500  - CW 1:1 observation for risk of elopement    #Polysubstance Use Disorder  - COWS monitoring for opiate use d/o (consider Methadone taper of 30mg day 1, then 20mg day 2, then 10mg day 3, then discontinue) if qtc <500  - Obtain Utox  - SBIRT consult for substance use    #Capacity/Dispo  - Patient does not have capacity to refuse any emergent medical interventions, labs or investigations   - Continue rest of the medical workup  - CL will follow, patient can not leave AMA; dispo pending      Care coordinated with pt.'s sister    52 y/o F with self reported h/o Bipolar disorder and with PMH of IV drug use (heroin/ cocaine?) and asthma presents with chest pain, fevers, chills body aches for the past few days. She was getting methadone from the [facility] methadone clinic but stopped 1 week ago and has been using Heroin. Pt was seen on March 12th for cellulitis and was discharged with antibiotics. Pt admitted for ACS rule out and endocarditis work up, Echo unremarkable. Found to have worsening transaminitis and tested Hep B core IgM and Hep B surface antigen +, with no signs of cirrhosis. Hepatitis work up continues.   psychiatry called for SI as the patient made a statement requesting a needle to end her life claiming she has DNR DNI at home  Patient seen in the ED, on 1:1. Evasive on exam, guarded. Received Haldol PRN earlier for agitation, perseverates on discharge, poor insight, not much forthcoming with the evaluation.   4/11: No PRN's overnight, refusing medical interventions, uncooperative on exam.     4/12: Extremely uncooperative, irritable, angry, loud/screaming, cursing at the 1:1 PCA making derogatory comments. Doesn't appear internally preoccupied.  Refuses to answer about AH, VH, HI. Denies SI.  No PRN's overnight, refusing medical interventions. Poor insight into her medical condition.     4/13: Pt initially cooperative but then became irritable. No AVH, paranoia, SIIP or HIIP. Continues to have poor insight. Start Zyprexa 5 mg PO qHs, patient can refuse.     4/15: Pt irritable throughout interview, attributes emotional and somatic symptoms as reactions to Zyprexa. Pt makes inflammatory statements but is redirectable. Pt describes distressing social issues including housing insecurity, lack of social support and concern of access to methadone treatment at [facility] upon discharge. Pt denies SI at this time, claims she makes such statements at times when she is feeling angry, upset, overwhelmed.     Following collateral obtained from Dr. [facility]:   Plan:    #Agitation  - D/C ZYPREXA 5 mg PO QHS  - START QUETIAPINE 50 mg PO QHS  - PRN- Haldol 5 mg IV/IM Q8 + Ativan 2 mg IV/IM Q8 for agitation if QTc <500  - CW 1:1 observation for risk of elopement    #Polysubstance Use Disorder  - COWS monitoring for opiate use d/o (consider Methadone taper of 30mg day 1, then 20mg day 2, then 10mg day 3, then discontinue) if qtc <500  - Obtain Utox  - SBIRT consult for substance use    #Capacity/Dispo  - Patient does not have capacity to refuse any emergent medical interventions, labs or investigations   - Continue rest of the medical workup  - CL will follow, patient can not leave AMA; dispo pending      Care coordinated with pt.'s sister    52 y/o F with self reported h/o Bipolar disorder and with PMH of IV drug use (heroin/ cocaine?) and asthma presents with chest pain, fevers, chills body aches for the past few days. She was getting methadone from the [facility] methadone clinic but stopped 1 week ago and has been using Heroin. Pt was seen on March 12th for cellulitis and was discharged with antibiotics. Pt admitted for ACS rule out and endocarditis work up, Echo unremarkable. Found to have worsening transaminitis and tested Hep B core IgM and Hep B surface antigen +, with no signs of cirrhosis. Hepatitis work up continues.   psychiatry called for SI as the patient made a statement requesting a needle to end her life claiming she has DNR DNI at home  Patient seen in the ED, on 1:1. Evasive on exam, guarded. Received Haldol PRN earlier for agitation, perseverates on discharge, poor insight, not much forthcoming with the evaluation.   4/11: No PRN's overnight, refusing medical interventions, uncooperative on exam.     4/12: Extremely uncooperative, irritable, angry, loud/screaming, cursing at the 1:1 PCA making derogatory comments. Doesn't appear internally preoccupied.  Refuses to answer about AH, VH, HI. Denies SI.  No PRN's overnight, refusing medical interventions. Poor insight into her medical condition.     4/13: Pt initially cooperative but then became irritable. No AVH, paranoia, SIIP or HIIP. Continues to have poor insight. Start Zyprexa 5 mg PO qHs, patient can refuse.     4/15: Pt irritable throughout interview, seems emotionally dysregulated, pt makes inflammatory statements but is redirectable. Pt describes distressing social issues including housing insecurity, lack of social support and concern of access to methadone treatment at [facility] upon discharge. Pt denies SI at this time, claims she makes such statements at times when she is feeling angry, upset, overwhelmed.     Following collateral obtained from  [facility]:   Plan:    #Agitation  - D/C ZYPREXA 5 mg PO QHS, pt. refusing to take it   - START QUETIAPINE 50 mg PO QHS (pt. amenable)  - PRN- Haldol 5 mg IV/IM Q8 + Ativan 2 mg IV/IM Q8 for agitation if QTc <500  - CW 1:1 observation for risk of elopement, consider transfer to 7S, will not need 1:1 on 7S    #Polysubstance Use Disorder  - CONTINUE Mtethadone as written if qtc <500  - SBIRT consult for substance use    #Capacity/Dispo  - Patient does not have capacity to refuse any emergent medical interventions, labs or investigations   - Continue rest of the medical workup  - CL will follow, patient can not leave AMA; dispo pending  -D9ispo: TBD,  pending collateral from outpatient psychiatrist      54 y/o F with self reported h/o Bipolar disorder and with PMH of IV drug use (heroin/ cocaine?) and asthma presents with chest pain, fevers, chills body aches for the past few days. She was getting methadone from the [facility] methadone clinic but stopped 1 week ago and has been using Heroin. Pt was seen on March 12th for cellulitis and was discharged with antibiotics. Pt admitted for ACS rule out and endocarditis work up, Echo unremarkable. Found to have worsening transaminitis and tested Hep B core IgM and Hep B surface antigen +, with no signs of cirrhosis. Hepatitis work up continues.   psychiatry called for SI as the patient made a statement requesting a needle to end her life claiming she has DNR DNI at home  Patient seen in the ED, on 1:1. Evasive on exam, guarded. Received Haldol PRN earlier for agitation, perseverates on discharge, poor insight, not much forthcoming with the evaluation.   4/11: No PRN's overnight, refusing medical interventions, uncooperative on exam.     4/12: Extremely uncooperative, irritable, angry, loud/screaming, cursing at the 1:1 PCA making derogatory comments. Doesn't appear internally preoccupied.  Refuses to answer about AH, VH, HI. Denies SI.  No PRN's overnight, refusing medical interventions. Poor insight into her medical condition.     4/13: Pt initially cooperative but then became irritable. No AVH, paranoia, SIIP or HIIP. Continues to have poor insight. Start Zyprexa 5 mg PO qHs, patient can refuse.     4/15: Pt irritable throughout interview, seems emotionally dysregulated, pt makes inflammatory statements but is redirectable. Pt describes distressing social issues including housing insecurity, lack of social support and concern of access to methadone treatment at [facility] upon discharge. Pt denies SI at this time, claims she makes such statements at times when she is feeling angry, upset, overwhelmed.       Plan:    #Agitation  - D/C ZYPREXA 5 mg PO QHS, pt. refusing to take it   - START QUETIAPINE 50 mg PO QHS (pt. amenable)  - PRN- Haldol 5 mg IV/IM Q8 + Ativan 2 mg IV/IM Q8 for agitation if QTc <500  - CW 1:1 observation for risk of elopement, consider transfer to 7S, will not need 1:1 on 7S    #Polysubstance Use Disorder  - CONTINUE Mtethadone as written if qtc <500  - SBIRT consult for substance use    #Capacity/Dispo  - Patient does not have capacity to refuse any emergent medical interventions, labs or investigations   - Continue rest of the medical workup  - CL will follow, patient can not leave AMA; dispo pending  -Dispo: TBD,  pending collateral from outpatient psychiatrist       Collateral ;   Attempted to call Dr. Kimbrough at [facility] ([phone]) several times, unable to reach  52 y/o F with self reported h/o Bipolar disorder and with PMH of IV drug use (heroin/ cocaine?) and asthma presents with chest pain, fevers, chills body aches for the past few days. She was getting methadone from the [facility] methadone clinic but stopped 1 week ago and has been using Heroin. Pt was seen on March 12th for cellulitis and was discharged with antibiotics. Pt admitted for ACS rule out and endocarditis work up, Echo unremarkable. Found to have worsening transaminitis and tested Hep B core IgM and Hep B surface antigen +, with no signs of cirrhosis. Hepatitis work up continues.   psychiatry called for SI as the patient made a statement requesting a needle to end her life claiming she has DNR DNI at home  Patient seen in the ED, on 1:1. Evasive on exam, guarded. Received Haldol PRN earlier for agitation, perseverates on discharge, poor insight, not much forthcoming with the evaluation.   4/11: No PRN's overnight, refusing medical interventions, uncooperative on exam.     4/12: Extremely uncooperative, irritable, angry, loud/screaming, cursing at the 1:1 PCA making derogatory comments. Doesn't appear internally preoccupied.  Refuses to answer about AH, VH, HI. Denies SI.  No PRN's overnight, refusing medical interventions. Poor insight into her medical condition.     4/13: Pt initially cooperative but then became irritable. No AVH, paranoia, SIIP or HIIP. Continues to have poor insight. Start Zyprexa 5 mg PO qHs, patient can refuse.     4/15: Pt irritable throughout interview, seems emotionally dysregulated, pt makes inflammatory statements but is redirectable. Pt describes distressing social issues including housing insecurity, lack of social support and concern of access to methadone treatment at [facility] upon discharge. Pt denies SI at this time, claims she makes such statements at times when she is feeling angry, upset, overwhelmed.       Plan:    #Agitation  - D/C ZYPREXA 5 mg PO QHS, pt. refusing to take it   - START QUETIAPINE 50 mg PO QHS (pt. amenable)  - PRN- Haldol 5 mg IV/IM Q8 + Ativan 2 mg IV/IM Q8 for agitation if QTc <500  - CW 1:1 observation for risk of elopement, consider transfer to 7S, will not need 1:1 on 7S    #Polysubstance Use Disorder  - CONTINUE Methadone as written if qtc <500  - SBIRT consult for substance use    #Capacity/Dispo  - Patient does not have capacity to refuse any emergent medical interventions, labs or investigations   - Continue rest of the medical workup  - CL will follow, patient can not leave AMA; dispo pending  -Dispo: TBD,  pending collateral from outpatient psychiatrist       Collateral ;   Attempted to call Dr. Kimbrough at [facility] ([phone]) several times, unable to reach

## 2024-04-15 NOTE — BH CONSULTATION LIAISON PROGRESS NOTE - NSBHATTESTCOMMENTATTENDFT_PSY_A_CORE
Chart reviewed, pt. seen/evaluated with the student, I agree with above assessment/plan. Patient oriented, still irritable and at times making provocative statements but redirectable,  reports she has h/o bipolar disorder and did well on Seroquel, amenable to start Seroquel, denies passive or active SIIP,  denies HIIP. Dispo pending collateral. Plan as above, will follow

## 2024-04-15 NOTE — BH CONSULTATION LIAISON PROGRESS NOTE - NSBHFUPINTERVALHXFT_PSY_A_CORE
Pt describes she had a "good weekend", stating she felt well and was glad she was visited by her daughter (Shayla - no contact info provided). Since receiving the dose of Zyprexa last night, pt "feels horrible," "hates life," and is in "agony". She states she feels pain all over her body, especially in her limbs and joints "like arthritis pain" along with sweating and chills. Pt provided more history, saying she has a history of bipolar disorder, borderline personality disorder, Heroin use disorder and methadone treatment at [facility]. She states that she has had bad reactions to Zyprexa and mood stabilizers in the past. Her current regimen includes Wellbutrin and Seroquel, provided contact info for psych collateral with Dr. Kimbrough at [facility] ([phone]). Pt is under acute stress as she is facing eviction, which has led to her relapsing on Heroin usage for the past 6 mos + inconsistent attendance at [place] down to 30 mg methadone daily. Pt distressed because she says her only support person is a friend, Harsha, who visited over the weekend, has her house key, cell phone and credit card. Pt endorses no active SI, attributes SA in the past to substance use.  Pt describes she had a "good weekend", stating she felt well and was glad she was visited by her daughter (Shayla - no contact info provided). Since receiving the dose of Zyprexa last night, pt "feels horrible," "hates life," and is in "agony". She states she feels pain all over her body, especially in her limbs and joints "like arthritis pain" along with sweating and chills. Pt provided more history, saying she has a history of bipolar disorder, borderline personality disorder, Heroin use disorder and methadone treatment at [facility]. She states that she has had bad reactions to Zyprexa and mood stabilizers in the past. Her current regimen includes Wellbutrin and Seroquel, provided contact info for psych collateral with Dr. Kimbrough at [facility] ([phone]). Pt is under acute stress as she is facing eviction, which has led to her relapsing on Heroin usage for the past 6 mos + inconsistent attendance at [place] down to 30 mg methadone daily. Pt distressed because she says her only support person is a friend, Harsha, who visited over the weekend, has her house key, cell phone and credit card. Pt endorses no active SI, attributes SA in the past to substance use.  Pt describes she had a "good weekend", stating she felt well and was glad she was visited by her daughter (Shayla - no contact info available,  though gave consent to speak with her daughter). Since receiving the dose of Zyprexa last night, pt states she "feels horrible" gave vague sxs and refusing to take Zyprexa. Pt provided more history, saying she has a history of bipolar disorder, borderline personality disorder, Heroin use disorder and methadone treatment at [facility]. She states that she did not do good on multiple  mood stabilizers in the past. Her current regimen includes Wellbutrin and Seroquel, provided contact info for psych collateral with Dr. Kimbrough at [facility] ([phone]). Pt is under acute stress as she is facing eviction, which has led to her relapsing on Heroin usage for the past 6 mos + inconsistent attendance at [place] down to 30 mg methadone daily. Pt distressed because she says her only support person is a friend, Harsha, who visited over the weekend, has her house key, cell phone. Pt endorses no passive or active SI, attributes SA in the past to substance use. Denies HIIP.

## 2024-04-15 NOTE — BH CONSULTATION LIAISON PROGRESS NOTE - OTHER
Preoccupied about Zyprexa, prior interactions with staff she interpreted as antagonistic to herself, guests still irritable but somewhat more cooperative  Preoccupied about Zyprexa, prior interactions with staff

## 2024-04-16 VITALS
RESPIRATION RATE: 19 BRPM | HEART RATE: 119 BPM | TEMPERATURE: 98 F | DIASTOLIC BLOOD PRESSURE: 62 MMHG | SYSTOLIC BLOOD PRESSURE: 129 MMHG | OXYGEN SATURATION: 97 %

## 2024-04-16 LAB
HAV IGG SER QL IA: REACTIVE
HBV CORE AB SER-ACNC: REACTIVE
HBV DNA # SERPL NAA+PROBE: DETECTED
HBV DNA # SERPL NAA+PROBE: SIGNIFICANT CHANGE UP IU/ML
HBV DNA SERPL NAA+PROBE-LOG#: 5.34 LOGIU/ML — SIGNIFICANT CHANGE UP
HBV E AB SER-ACNC: REACTIVE
HBV E AG SER-ACNC: REACTIVE
IGG FLD-MCNC: 1969 MG/DL — HIGH (ref 610–1660)
IGG1 SER-MCNC: 1743 MG/DL — HIGH (ref 240–1118)
IGG2 SER-MCNC: 251 MG/DL — SIGNIFICANT CHANGE UP (ref 124–549)
IGG3 SER-MCNC: 60.2 MG/DL — SIGNIFICANT CHANGE UP (ref 15–102)
IGG4 SER-MCNC: 30.2 MG/DL — SIGNIFICANT CHANGE UP (ref 1–123)

## 2024-04-16 PROCEDURE — 99233 SBSQ HOSP IP/OBS HIGH 50: CPT

## 2024-04-16 PROCEDURE — 99232 SBSQ HOSP IP/OBS MODERATE 35: CPT | Mod: GC

## 2024-04-16 PROCEDURE — 99222 1ST HOSP IP/OBS MODERATE 55: CPT | Mod: GC

## 2024-04-16 PROCEDURE — 99239 HOSP IP/OBS DSCHRG MGMT >30: CPT

## 2024-04-16 RX ORDER — QUETIAPINE FUMARATE 200 MG/1
0.5 TABLET, FILM COATED ORAL
Qty: 0 | Refills: 0 | DISCHARGE

## 2024-04-16 RX ORDER — NICOTINE POLACRILEX 2 MG
1 GUM BUCCAL
Qty: 0 | Refills: 0 | DISCHARGE
Start: 2024-04-16

## 2024-04-16 RX ORDER — BUPROPION HYDROCHLORIDE 150 MG/1
1 TABLET, EXTENDED RELEASE ORAL
Qty: 0 | Refills: 0 | DISCHARGE

## 2024-04-16 RX ADMIN — Medication 1 PATCH: at 06:21

## 2024-04-16 RX ADMIN — Medication 1 PATCH: at 11:38

## 2024-04-16 RX ADMIN — METHADONE HYDROCHLORIDE 10 MILLIGRAM(S): 40 TABLET ORAL at 11:36

## 2024-04-16 NOTE — BH CONSULTATION LIAISON PROGRESS NOTE - NSBHCONSULTFOLLOWAFTERCARE_PSY_A_CORE FT
Pt to follow up outpatient with Dr. Kimbrough at [facility] May 20th at 12:00 PM.  follow up with Dr. Luna [phone] at  [facility] follow up with Dr. Luna [phone] ext [phone] at  [facility] on 5/20 at 12:00 PM.

## 2024-04-16 NOTE — DISCHARGE NOTE PROVIDER - CARE PROVIDER_API CALL
LUIS SCALES S  [address] [facility] [address] [place] [id number]  Phone: [phone]  Fax: ()-  Follow Up Time:

## 2024-04-16 NOTE — BH CONSULTATION LIAISON PROGRESS NOTE - NSBHFUPINTERVALHXFT_PSY_A_CORE
Pt endorses feeling better, her mood is improved, no pain, improved appetite and ate a full breakfast. Pt attributes change to the Zyprexa moving out of her system. Pt acknowledged prior behavioral disturbances, explained that she has a difficult time managing emotions or expressing herself in a different way. Pt states that she often says things that she doesn't really mean, doesn't recall making a suicidal statement in the ED because she was high. No SI/HI/hallucinations/delusions. Pt grateful for treatment received, expressed desire for resources from social work to facilitate healthcare access outpt. Pt amenable to plan for discharge today.  Patient refused standing Seroquel and received Haldol 5mg X1 prn. Pt endorses feeling better, her mood is improved, no pain, improved appetite and ate a full breakfast.  Pt acknowledged prior behavioral disturbances, explained that she has a difficult time managing emotions or expressing herself in a different way. Pt states that she often says things that she doesn't really mean, doesn't recall making a suicidal statement in the ED , stating " I was high". No SI/HI/hallucinations/delusions. Pt grateful for treatment received, expressed desire for resources from social work to facilitate for transport to go foe her follow ups.      When discussed about refusal of Seroquel, pt stated that would like to continue it but she was sleeping thats why did not take it..

## 2024-04-16 NOTE — PROGRESS NOTE ADULT - SUBJECTIVE AND OBJECTIVE BOX
Interval Events:   Patient feels much better today from an emotional standpoint. She otherwise has no new complaints.   Unable to obtain labs due to difficult blood draws  Vitals and exam stable  INR normal    Hospital Medications:  acetaminophen     Tablet .. 650 milliGRAM(s) Oral every 6 hours PRN  aluminum hydroxide/magnesium hydroxide/simethicone Suspension 30 milliLiter(s) Oral every 4 hours PRN  haloperidol    Injectable 5 milliGRAM(s) IntraMuscular every 8 hours PRN  influenza   Vaccine 0.5 milliLiter(s) IntraMuscular once  LORazepam   Injectable 2 milliGRAM(s) IntraMuscular every 8 hours PRN  melatonin 3 milliGRAM(s) Oral at bedtime PRN  methadone    Tablet 10 milliGRAM(s) Oral daily  nicotine - 21 mG/24Hr(s) Patch 1 Patch Transdermal daily  ondansetron Injectable 4 milliGRAM(s) IV Push every 8 hours PRN  QUEtiapine 50 milliGRAM(s) Oral at bedtime      ROS: See above.    PHYSICAL EXAM:   Vital Signs:  Vital Signs Last 24 Hrs  T(C): 36.8 (16 Apr 2024 11:05), Max: 36.9 (15 Apr 2024 20:14)  T(F): 98.3 (16 Apr 2024 11:05), Max: 98.4 (15 Apr 2024 20:14)  HR: 119 (16 Apr 2024 11:05) (90 - 119)  BP: 129/62 (16 Apr 2024 11:05) (125/61 - 129/62)  BP(mean): --  RR: 19 (16 Apr 2024 11:05) (17 - 19)  SpO2: 97% (16 Apr 2024 11:05) (97% - 98%)    Parameters below as of 16 Apr 2024 11:05  Patient On (Oxygen Delivery Method): room air        GENERAL:  NAD, resting comfortably in bed  HEENT:  sclera anicteric  CHEST:  Normal Effort  HEART:  HDS  ABDOMEN:  Soft, non-tender, non-distended  SKIN:  Warm & Dry. No jaundice  NEURO:  Alert, conversant, no focal deficit. No asterixis    LABS:                        12.7   8.55  )-----------( 205      ( 15 Apr 2024 19:24 )             40.4     Mean Cell Volume: 80.2 fL (04-15-24 @ 19:24)    04-15    133<L>  |  98  |  9   ----------------------------<  153<H>  4.2   |  23  |  0.63    Ca    9.1      15 Apr 2024 19:24  Phos  3.3     04-15  Mg     2.00     04-15    TPro  7.6  /  Alb  3.3  /  TBili  1.4<H>  /  DBili  x   /  AST  777<H>  /  ALT  905<H>  /  AlkPhos  199<H>  04-15    LIVER FUNCTIONS - ( 15 Apr 2024 19:24 )  Alb: 3.3 g/dL / Pro: 7.6 g/dL / ALK PHOS: 199 U/L / ALT: 905 U/L / AST: 777 U/L / GGT: x           PT/INR - ( 15 Apr 2024 19:24 )   PT: 13.1 sec;   INR: 1.18 ratio         PTT - ( 15 Apr 2024 19:24 )  PTT:34.5 sec

## 2024-04-16 NOTE — BH CONSULTATION LIAISON PROGRESS NOTE - NSICDXBHSECONDARYDX_PSY_ALL_CORE
Heroin dependence   F11.20  Hyponatremia   E87.1  Prophylactic measure   Z29.9  Chest pain   R07.9  Suicidal ideation   R45.851  Elevated LFTs   R79.89  Hepatitis B virus infection, unspecified chronicity   B19.10   Heroin dependence   F11.20

## 2024-04-16 NOTE — PROGRESS NOTE ADULT - PROBLEM SELECTOR PLAN 5
DVT ppx: SCDs  Diet Regular fluid restrict  Dispo pending cardiac/ infectious work up  SW consulted for addiction resources
cont 1:1  trial of zyprexa per psych and re-eval 4/15 for further psych intervention
cont 1:1  trial of zyprexa per psych and re-eval 4/15 for further psych intervention
DVT ppx: SCDs  Diet Regular fluid restrict  Dispo pending cardiac/ infectious work up  SW consulted for addiction resources
cont 1:1  fatigue with zyprexa, switched to Seroquel on 4/15 per psych.   no capacity to leave AMA.
cont 1:1  fatigue with zyprexa, switched to Seroquel on 4/15 per psych.   no capacity to leave AMA.

## 2024-04-16 NOTE — BH CONSULTATION LIAISON PROGRESS NOTE - NSICDXBHPRIMARYDX_PSY_ALL_CORE
History of bipolar disorder   Z86.59  

## 2024-04-16 NOTE — DISCHARGE NOTE NURSING/CASE MANAGEMENT/SOCIAL WORK - NSDCPEEMAIL_GEN_ALL_CORE
[facility] for Tobacco Control email [email or web address] Mirvaso Counseling: Mirvaso is a topical medication which can decrease superficial blood flow where applied. Side effects are uncommon and include stinging, redness and allergic reactions.

## 2024-04-16 NOTE — DISCHARGE NOTE PROVIDER - HOSPITAL COURSE
52 y/o F with PMH of IV drug use (heroin), bipolar disorder and asthma presents with chest pain, fevers, chills body aches for the past few days. Pt admitted for ACS rule out and endocarditis work up, declining TTE and Bcx negative, no fever, no leukocytosis, low suspicion for IE at this time. noted to have elevated LFT with hepatitis serology for acute HBV. LFT remain elevated but stable. needs outpatient follow up with hepatology to continue monitoring of her liver function.

## 2024-04-16 NOTE — PROGRESS NOTE ADULT - PROBLEM SELECTOR PROBLEM 4
Hyponatremia
Prophylactic measure
Prophylactic measure
Hyponatremia
Prophylactic measure
Prophylactic measure

## 2024-04-16 NOTE — DISCHARGE NOTE NURSING/CASE MANAGEMENT/SOCIAL WORK - NSDCPEFALRISK_GEN_ALL_CORE
For information on Fall & Injury Prevention, visit: [email or web address] OR  [email or web address] OR  https://www.cdc.gov/steadi/patient.html

## 2024-04-16 NOTE — PROGRESS NOTE ADULT - PROVIDER SPECIALTY LIST ADULT
Cardiology
Hospitalist
Hepatology
Hepatology
Hospitalist

## 2024-04-16 NOTE — BH CONSULTATION LIAISON PROGRESS NOTE - OTHER
Guilt/remorse regarding her behavior towards staff Resting comfortably in hospital gown, bed alesha regarding her behavior towards staff

## 2024-04-16 NOTE — BH CONSULTATION LIAISON PROGRESS NOTE - NSBHMSEPERCEPT_PSY_A_CORE
No abnormalities Xolair Counseling:  Patient informed of potential adverse effects including but not limited to fever, muscle aches, rash and allergic reactions.  The patient verbalized understanding of the proper use and possible adverse effects of Xolair.  All of the patient's questions and concerns were addressed.

## 2024-04-16 NOTE — PROGRESS NOTE ADULT - PROBLEM SELECTOR PLAN 1
no further CP  low suspicion for ACS  ECHO done, cannot rule out veg; MELISSA ordered but low suspicion
no further CP  low suspicion for ACS  ECHO done, report P, if neg can dc tele
- iso IV drug use, need to rule-out IE.   - Continue with Cefepime 2g a8h and Vancomycin + MRSA positive on CPR  - Bcx NGTD can d/c abx if   - TTE pending - r/o vegetation from endocarditis but patient refused.   - EKG showing NSR HR 92 QTc 432  - Troponin negative x2   - Admit to tele  -  x1 in ED  - Cardiology rec appreciated, no trop elevation. low susp for ACS.
episode of CP last night, pt reports worse than on admission  eval unremarkable  ECHO unremarkable

## 2024-04-16 NOTE — PROGRESS NOTE ADULT - SUBJECTIVE AND OBJECTIVE BOX
[facility] Division of Hospital Medicine  Dl Najera MD  In House Pager [phone]    Patient is a 53y old  Female who presents with a chief complaint of chest pain (15 Apr 2024 12:24)    OVERNIGHT EVENTS: no acute events.   SUBJECTIVE: no new subjective symptoms.   ROS: Denied Fever, Chill, CP, SOB, Abd pain, N/V/D, LE swelling or pain.     MEDICATIONS  (STANDING):  influenza   Vaccine 0.5 milliLiter(s) IntraMuscular once  methadone    Tablet 10 milliGRAM(s) Oral daily  nicotine - 21 mG/24Hr(s) Patch 1 Patch Transdermal daily  QUEtiapine 50 milliGRAM(s) Oral at bedtime    MEDICATIONS  (PRN):  acetaminophen     Tablet .. 650 milliGRAM(s) Oral every 6 hours PRN Temp greater or equal to 38C (100.4F), Mild Pain (1 - 3)  aluminum hydroxide/magnesium hydroxide/simethicone Suspension 30 milliLiter(s) Oral every 4 hours PRN Dyspepsia  haloperidol    Injectable 5 milliGRAM(s) IntraMuscular every 8 hours PRN Agitation  LORazepam   Injectable 2 milliGRAM(s) IntraMuscular every 8 hours PRN Agitation  melatonin 3 milliGRAM(s) Oral at bedtime PRN Insomnia  ondansetron Injectable 4 milliGRAM(s) IV Push every 8 hours PRN Nausea and/or Vomiting    CAPILLARY BLOOD GLUCOSE        I&O's Summary      Vital Signs Last 24 Hrs  T(C): 36.8 (16 Apr 2024 11:05), Max: 36.9 (15 Apr 2024 13:14)  T(F): 98.3 (16 Apr 2024 11:05), Max: 98.5 (15 Apr 2024 13:14)  HR: 119 (16 Apr 2024 11:05) (85 - 119)  BP: 129/62 (16 Apr 2024 11:05) (125/61 - 149/85)  BP(mean): --  RR: 19 (16 Apr 2024 11:05) (17 - 19)  SpO2: 97% (16 Apr 2024 11:05) (97% - 98%)    Parameters below as of 16 Apr 2024 11:05  Patient On (Oxygen Delivery Method): room air        LABS:                        12.7   8.55  )-----------( 205      ( 15 Apr 2024 19:24 )             40.4     04-15    133<L>  |  98  |  9   ----------------------------<  153<H>  4.2   |  23  |  0.63    Ca    9.1      15 Apr 2024 19:24  Phos  3.3     04-15  Mg     2.00     04-15    TPro  7.6  /  Alb  3.3  /  TBili  1.4<H>  /  DBili  x   /  AST  777<H>  /  ALT  905<H>  /  AlkPhos  199<H>  04-15    PT/INR - ( 15 Apr 2024 19:24 )   PT: 13.1 sec;   INR: 1.18 ratio         PTT - ( 15 Apr 2024 19:24 )  PTT:34.5 sec      Urinalysis Basic - ( 15 Apr 2024 19:24 )    Color: x / Appearance: x / SG: x / pH: x  Gluc: 153 mg/dL / Ketone: x  / Bili: x / Urobili: x   Blood: x / Protein: x / Nitrite: x   Leuk Esterase: x / RBC: x / WBC x   Sq Epi: x / Non Sq Epi: x / Bacteria: x        RADIOLOGY & ADDITIONAL TESTS:  Results Reviewed: Y  Imaging Personally Reviewed: Y  Electrocardiogram Personally Reviewed: Y    COORDINATION OF CARE:  Care Discussed with Consultants/Other Providers [Y/N]: Y  Prior or Outpatient Records Reviewed [Y/N]: TARA

## 2024-04-16 NOTE — BH CONSULTATION LIAISON PROGRESS NOTE - NSBHASSESSMENTFT_PSY_ALL_CORE
52 y/o F with self reported h/o Bipolar disorder and with PMH of IV drug use (heroin/ cocaine?) and asthma presents with chest pain, fevers, chills body aches for the past few days. She was getting methadone from the [facility] methadone clinic but stopped 1 week ago and has been using Heroin. Pt was seen on March 12th for cellulitis and was discharged with antibiotics. Pt admitted for ACS rule out and endocarditis work up, Echo unremarkable. Found to have worsening transaminitis and tested Hep B core IgM and Hep B surface antigen +, with no signs of cirrhosis. Hepatitis work up continues.   psychiatry called for SI as the patient made a statement requesting a needle to end her life claiming she has DNR DNI at home  Patient seen in the ED, on 1:1. Evasive on exam, guarded. Received Haldol PRN earlier for agitation, perseverates on discharge, poor insight, not much forthcoming with the evaluation.   4/11: No PRN's overnight, refusing medical interventions, uncooperative on exam.     4/12: Extremely uncooperative, irritable, angry, loud/screaming, cursing at the 1:1 PCA making derogatory comments. Doesn't appear internally preoccupied.  Refuses to answer about AH, VH, HI. Denies SI.  No PRN's overnight, refusing medical interventions. Poor insight into her medical condition.     4/13: Pt initially cooperative but then became irritable. No AVH, paranoia, SIIP or HIIP. Continues to have poor insight. Start Zyprexa 5 mg PO qHs, patient can refuse.     4/15: Pt irritable throughout interview, seems emotionally dysregulated, pt makes inflammatory statements but is redirectable. Pt describes distressing social issues including housing insecurity, lack of social support and concern of access to methadone treatment at [facility] upon discharge. Pt denies SI at this time, claims she makes such statements at times when she is feeling angry, upset, overwhelmed.     4/16: Pt pleasant during interview today, which she attributes to Zyprexa being out of her system. Expresses she feels well with resolution of pain and other symptoms. Demonstrated insight to her behavioral challenges and explained why she acts that way sometimes. Pt counseled on considering other ways to express herself. Pt still has acute stressors (being evicted, likely d/c to shelter system), expresses desire to talk to social work and access resources, especially to help her access transportation to pending health appointments.     Collateral received from Dr. Luna who confirms pt has attended the [facility] for several years. Last had encounter in January of this year. Confirmed pt's medications, and expressed that pt's prior behaviors are aligned with her baseline. In her opinion, does not require an inpatient psych admission. Scheduled a follow up appointment for 5/20 at 12:00 PM.     Pt appears well, demonstrates much better insight and judgment, and denies SI/HI/delusions/hallucinations. No need for inpatient psych at this time.     Plan:    #Capacity/Dispo  - Social work to establish outpatient resources, plans to access treatment  - Patient does not have capacity to refuse any emergent medical interventions, labs or investigations   - Continue rest of the medical workup  - CL will follow, patient can not leave AMA; dispo pending  -Dispo: TBD,  pending collateral from outpatient psychiatrist     #Agitation  - D/C ZYPREXA 5 mg PO QHS, pt. refusing to take it   - START QUETIAPINE 50 mg PO QHS (pt. amenable)  - PRN- Haldol 5 mg IV/IM Q8 + Ativan 2 mg IV/IM Q8 for agitation if QTc <500  - CW 1:1 observation for risk of elopement, consider transfer to 7S, will not need 1:1 on 7S    #Polysubstance Use Disorder  - CONTINUE Methadone as written if qtc <500  - SBIRT consult for substance use     54 y/o F with self reported h/o Bipolar disorder and with PMH of IV drug use (heroin/ cocaine?) and asthma presents with chest pain, fevers, chills body aches for the past few days. She was getting methadone from the [facility] methadone clinic but stopped 1 week ago and has been using Heroin. Pt was seen on March 12th for cellulitis and was discharged with antibiotics. Pt admitted for ACS rule out and endocarditis work up, Echo unremarkable. Found to have worsening transaminitis and tested Hep B core IgM and Hep B surface antigen +, with no signs of cirrhosis. Hepatitis work up continues.   psychiatry called for SI as the patient made a statement requesting a needle to end her life claiming she has DNR DNI at home  Patient seen in the ED, on 1:1. Evasive on exam, guarded. Received Haldol PRN earlier for agitation, perseverates on discharge, poor insight, not much forthcoming with the evaluation.   4/11: No PRN's overnight, refusing medical interventions, uncooperative on exam.     4/12: Extremely uncooperative, irritable, angry, loud/screaming, cursing at the 1:1 PCA making derogatory comments. Doesn't appear internally preoccupied.  Refuses to answer about AH, VH, HI. Denies SI.  No PRN's overnight, refusing medical interventions. Poor insight into her medical condition.     4/13: Pt initially cooperative but then became irritable. No AVH, paranoia, SIIP or HIIP. Continues to have poor insight. Start Zyprexa 5 mg PO qHs, patient can refuse.     4/15: Pt irritable throughout interview, seems emotionally dysregulated, pt makes inflammatory statements but is redirectable. Pt describes distressing social issues including housing insecurity, lack of social support and concern of access to methadone treatment at [facility] upon discharge. Pt denies SI at this time, claims she makes such statements at times when she is feeling angry, upset, overwhelmed.     4/16: Pt pleasant during interview today. Expresses she feels well with resolution of pain and other symptoms. Demonstrated insight to her behavioral challenges and explained why she acts that way sometimes. Pt counseled on considering other ways to express herself. Pt still has acute stressors (being evicted), expresses desire to talk to social work and access resources, especially to help her access transportation to pending health appointments.     Pt appears well, demonstrates much better insight and judgment, and denies SI/HI/delusions/hallucinations. Does not meet criteria for involuntary psychiatric admission at this time    Collateral received from outpatient psychiatrist Dr. Luna [phone] ext [phone] who confirms pt has attended the [facility] for several years. Last had encounter in January of this year. Confirmed pt's medications (Seroquel 100mg hs and Wellbutrin  mg) , discussed patient's behavior at [facility], she reports that this is patient's baseline, at baseline pt.  can be hostile/difficult, she further reports that patient takes medications whenever she wants.  Dr. Luna does not think that patient require an inpatient psych admission. Scheduled a follow up appointment for 5/20 at 12:00 PM.       Plan:  - CONTINUE  QUETIAPINE 50 mg PO QHS and Wellbutrin  mg po daily  (pt. amenable)  - PRN- Haldol 5 mg IV/IM Q8 + Ativan 2 mg IV/IM Q8 for agitation if QTc <500  -  1:1 observation for risk of elopement, consider transfer to 7S, will not need 1:1 on 7S    #Polysubstance Use Disorder  - CONTINUE Methadone as written if qtc <500, pt. wants to  return to [facility] upon dc to resume care  - SBIRT consult for substance use    Dispo: Does not meet criteria for involuntary psychiatric admission at this time

## 2024-04-16 NOTE — DISCHARGE NOTE NURSING/CASE MANAGEMENT/SOCIAL WORK - NSDCPEWEB_GEN_ALL_CORE
[facility] for Tobacco Control website --- [email or web address] website --- [email or web address]

## 2024-04-16 NOTE — PROGRESS NOTE ADULT - PROBLEM SELECTOR PROBLEM 2
Heroin dependence
Heroin dependence
Suicide ideation
Suicide ideation
Heroin dependence
Heroin dependence

## 2024-04-16 NOTE — BH CONSULTATION LIAISON PROGRESS NOTE - NSBHTIMEACTIVITIESPERFORMED_PSY_A_CORE
Care coordinated with pt.' sister and medical attending 
 Care coordinated with pt.'s outpatient  psychiatrist and Dr. Najera in person

## 2024-04-16 NOTE — PROGRESS NOTE ADULT - ASSESSMENT
Ms. Peng is a 53 year old female with heroin use disorder, nicotine use disorder, occasional crack cocaine use, bipolar disorder and asthma who presented with chest pain, subjective fevers, chills, and body aches, admitted for cardiac and infectious work-up. Hospital course is complicated by abnormal liver chemistry.     #Acute Hepatitis B infection  #Acute liver injury  #Polysubstance use disorder  #Intravenous drug use   Patient presented with an acute febrile syndrome and found to have abnormal liver chemistry in a predominant hepatocellular pattern of injury. Prior labs were c/w mild AST elevation (35) only. On admission, , ,  and T Bili 1.2. Chemistry is up-trending to , , , T Bili 1.7. Acute viral hepatitis panel is notable for reactive Hep B core IgM and Hep B surface Ag, concern for acute Hepatitis B infection. Hep A IgM and HCV Ab are negative. No regular alcohol use; presentation is not c/w alcoholic hepatitis. She has no known history of liver disease.  RUQ US does not have findings c/w cirrhosis, portal hypertension or acute biliary pathology. PLT normal. No signs of cirrhosis or acute liver failure. Infectious work-up is otherwise negative. Echo with normal RV function. Less likely AIH or DILI.  HIV negative.     Recommendations:  - F/u HBV DNA, Hep B eAg , Hep B eAb, Hep B core IgG and HIV testing  - Check PT/INR   - Check Hepatitis E IgM, Hepatitis A IgG  - Check PUJA, ASMA and total IgG to exclude concomitant autoimmune hepatitis  - Obtain US Abdomen   - Trend CBC, CMP, PT/INR and clinical symptoms  - No role for antiviral therapy at this time  - Patient will require f/u in Hepatology clinic for long-term monitoring     Dl Guan MD  Gastroenterology/Hepatology Fellow  Available via [employer] Teams  Pager: [phone]    On Weekdays after 5 PM to 8AM and Weekends/Holidays (All day)  For non-urgent consults, please email [email or web address] or [email or web address]  For urgent consults, please contact on call GI team.  See Amion schedule ([facility]), Zeussing system - [phone] ([facility]), or call hospital  ([facility])
Ms. Peng is a 53 year old female with heroin use disorder, nicotine use disorder, occasional crack cocaine use, bipolar disorder and asthma who presented with chest pain, subjective fevers, chills, and body aches, admitted for cardiac and infectious work-up. Hospital course is complicated by abnormal liver chemistry.     #Acute Hepatitis B infection  #Acute liver injury  #Polysubstance use disorder  #Intravenous drug use   Patient presented with an acute febrile syndrome and found to have abnormal liver chemistry in a predominant hepatocellular pattern of injury. Prior labs were c/w mild AST elevation (35) only. On admission, , ,  and T Bili 1.2. Chemistry is up-trending to , , , T Bili 1.7. INR normal. Acute viral hepatitis panel is notable for reactive Hep B core IgM and Hep B surface Ag, likely due to acute Hepatitis B infection. Hep A IgM and HCV Ab are negative. No regular alcohol use; presentation is not c/w alcoholic hepatitis. She has no known history of liver disease.  RUQ US does not have findings c/w cirrhosis, portal hypertension or acute biliary pathology. PLT normal. No signs of cirrhosis or acute liver failure. Infectious work-up is otherwise negative. Echo with normal RV function. Less likely AIH or DILI. HIV negative.     Recommendations:  - F/u HBV DNA, Hep B eAg , Hep B eAb, Hep B core IgG  - Check Hepatitis E IgM, Hepatitis A IgG  - Check PUJA, ASMA and total IgG to exclude concomitant autoimmune hepatitis  - Obtain repeat US Abdomen   - Trend CMP and clinical symptoms  - No role for antiviral therapy at this time  - F/u in Hepatology clinic upon discharge for long-term monitoring     Follow up in Hepatology Clinic: [phone] ([facility] at [address] [place] [address]) or [phone] ([facility] at [address] [place] [address]) or [phone] ([facility] at [address] [place] [address])     Dl Guan MD  Gastroenterology/Hepatology Fellow  Available via [employer] Teams  Pager: [phone]    On Weekdays after 5 PM to 8AM and Weekends/Holidays (All day)  For non-urgent consults, please email [email or web address]  For urgent consults, please contact on call GI team.  See Parth schedule ([facility]), IBUonlineing system - [phone] ([facility]), or call hospital  ([facility])
54 y/o F with PMH of IV drug use (heroin/ cocaine?), bipolar disorder and asthma presents with chest pain, fevers, chills body aches for the past few days.  Troponin is negative and EKG is non ischemic.     #Chest pain  Pain is more likely MSK/myalgias from history. Pain could also be due to her drug use, specifically cocaine but there is no elevation of trop or EKG changes to suggest spasm or ischemia at this time.   -Patient refusing TTE  -Can stop trending troponin as it was negative x2  -Cardiology will signoff at this time, please call with any questions.     Davin Breen MD  Cardiology fellow
54 y/o F with PMH of IV drug use (heroin), bipolar disorder and asthma presents with chest pain, fevers, chills body aches for the past few days. Pt admitted for ACS rule out and endocarditis work up. 
52 y/o F with PMH of IV drug use (heroin), bipolar disorder and asthma presents with chest pain, fevers, chills body aches for the past few days. Pt admitted for ACS rule out and endocarditis work up, declining workup and Bcx negative, no fever, no leukocytosis, low suspicion for IE at this time. noted to have elevated LFT with hepatitis serology for acute HBV. 
52 y/o F with PMH of IV drug use (heroin), bipolar disorder and asthma presents with chest pain, fevers, chills body aches for the past few days. Pt admitted for ACS rule out and endocarditis work up, declining workup and Bcx negative, no fever, no leukocytosis, low suspicion for IE at this time. noted to have elevated LFT with hepatitis serology for acute HBV. 
52 y/o F with PMH of IV drug use (heroin), bipolar disorder and asthma presents with chest pain, fevers, chills body aches for the past few days. Pt admitted for ACS rule out and endocarditis work up. 
54 y/o F with PMH of IV drug use (heroin), bipolar disorder and asthma presents with chest pain, fevers, chills body aches for the past few days. Pt admitted for ACS rule out and endocarditis work up. 
52 y/o F with PMH of IV drug use (heroin), bipolar disorder and asthma presents with chest pain, fevers, chills body aches for the past few days. Pt admitted for ACS rule out and endocarditis work up.

## 2024-04-16 NOTE — BH CONSULTATION LIAISON PROGRESS NOTE - NSBHATTESTCOMMENTATTENDFT_PSY_A_CORE
Chart reviewed, pt. seen/evaluated with the student, I agree with above assessment/plan. Patient oriented/cooperative, pleasant, linear/coherent,  more  forthcoming, expressed remorse on her recent behaviors and apologized multiple times during an interview,  denies SIIP, denies HIIP, no psychosis, no acute manic sxs. Care coordinated with pt.'s outpatient  psychiatrist and case d/w Dr. Najera in person.

## 2024-04-16 NOTE — DISCHARGE NOTE PROVIDER - NSDCCPCAREPLAN_GEN_ALL_CORE_FT
PRINCIPAL DISCHARGE DIAGNOSIS  Diagnosis: Chest pain  Assessment and Plan of Treatment: the chest pain was less likely due to heart disease. please avoid further IVDU.      SECONDARY DISCHARGE DIAGNOSES  Diagnosis: Shortness of breath  Assessment and Plan of Treatment:     Diagnosis: Chills  Assessment and Plan of Treatment:     Diagnosis: Acute hepatitis B  Assessment and Plan of Treatment: your liver enzyme was elevated, and your blood work tested positive for hepatitis B. There is no treatment for this infection and most of time self limiting. follow up with hepatologist.

## 2024-04-16 NOTE — BH CONSULTATION LIAISON PROGRESS NOTE - NSBHATTESTTYPEVISIT_PSY_A_CORE
Attending with Resident/Fellow/Student
Attending with Resident/Fellow/Student
Resident/Fellow with telephonic supervision
Attending with Resident/Fellow/Student
Attending with Resident/Fellow/Student

## 2024-04-16 NOTE — DISCHARGE NOTE NURSING/CASE MANAGEMENT/SOCIAL WORK - PATIENT PORTAL LINK FT
You can access the FollowMyHealth Patient Portal offered by [facility] by registering at the following website: [email or web address]. By joining [employer]’s FollowMyHealth portal, you will also be able to view your health information using other applications (apps) compatible with our system.

## 2024-04-16 NOTE — PROGRESS NOTE ADULT - PROBLEM SELECTOR PROBLEM 5
Suicidal ideation
Suicidal ideation
Prophylactic measure
Prophylactic measure
Suicidal ideation
Suicidal ideation

## 2024-04-16 NOTE — DISCHARGE NOTE PROVIDER - NSFOLLOWUPCLINICS_GEN_ALL_ED_FT
[facility] [place]  Gastroenterology  [address] [place] [address]  Phone: [phone]  Fax:   Follow Up Time: 1 month     [facility] [place]  Internal Medicine  [address] [place] [address]  Phone: [phone]  Fax: [phone]

## 2024-04-16 NOTE — PROGRESS NOTE ADULT - PROBLEM SELECTOR PLAN 6
remains asymptomatic  hep panel : Hep B core IgM reactive and surface Ag reactive  RUQ sono unremarkable  GI/hep rec - full HBV serology.
remains asymptomatic  hep panel : Hep B core IgM reactive and surface Ag reactive  RUQ sono unremarkable  GI/hep emailed
asymptomatic  check acute hep panel  check RUQ sono
remains asymptomatic  hep panel : Hep B core IgM reactive and surface Ag reactive  RUQ sono unremarkable  GI/hep rec - full HBV serology.

## 2024-04-16 NOTE — PROGRESS NOTE ADULT - PROBLEM SELECTOR PLAN 4
DVT ppx: SCDs  Diet Regular   Dispo pending cardiac/ infectious work up  SW consulted for addiction resources
DVT ppx: SCDs  Diet Regular   Dispo pending cardiac/ infectious work up  SW consulted for addiction resources
labs severe hemolyzed  repeat P
DVT ppx: SCDs  Diet Regular fluid restrict  Dispo pending cardiac/ infectious work up  SW consulted for addiction resources
DVT ppx: SCDs  Diet Regular   Dispo pending cardiac/ infectious work up  SW consulted for addiction resources
- Fluid restrict- appears to be euvolemic    - 130 in ED   - Trend Na

## 2024-04-16 NOTE — DISCHARGE NOTE PROVIDER - NSDCMRMEDTOKEN_GEN_ALL_CORE_FT
Bactrim  mg-160 mg oral tablet: 1 tab(s) orally 2 times a day   nicotine 21 mg/24 hr transdermal film, extended release: 1 patch transdermal once a day as needed for smoking cessation  Seroquel 100 mg oral tablet: 0.5 tab(s) orally once a day (at bedtime)  Wellbutrin  mg/24 hours oral tablet, extended release: 1 tab(s) orally once a day

## 2024-04-16 NOTE — PROGRESS NOTE ADULT - PROBLEM SELECTOR PLAN 2
requesting needle to end her life claiming she has DNR DNI at home; no longer feeling this way; coop with care  - Placed on 1 to 1  d/w Dr Avalos that pt is approaching medical stability, await final recs regarding psych plan
- Per ACP pt declining TTE, requesting needle to end her life claiming she has DNR DNI at home  - Placed on 1 to 1  - f/u psych eval.
- Last use 4/9.   Was receiving methadone form [facility] but pt intermittently on and off while using in between  currently does not feel like she is withdrawing  cont methadone 10 mg for now; no need to taper  she can return to [facility] upon dc to resume care, she is aware of their policy that she will need to start at low dosing again since she hasn't gone to get her daily meds
- Last use 4/9.   Was receiving methadone form [facility] but pt intermittently on and off while using in between  currently does not feel like she is withdrawing  cont methadone 10 mg for now; no need to taper  she can return to [facility] upon dc to resume care, she is aware of their policy that she will need to start at low dosing again since she hasn't gone to get her daily meds
- Last use 4/9.   Was receiving methadone form [facility] but pt intermittently on and off while using in between  currently does not feel like she is withdrawing  cont methadone 10 mg for now; no need to taper  she can return to [facility] upon dc to resume care, she is aware of their policy that she will need to start at low dosing again since she hasn't gone to get her daily meds
- Last use 4/9.   Was receiving methadone form [facility] but pt intermittently on and off while using in between  currently does not feel like she is withdrawing  cont methadone 10 mg for now; no need to taper  she can return to [facility] upon dc to resume care, she is aware of their policy that she will need to start at low dosing again since she hasn't gone to get her daily meds

## 2024-04-16 NOTE — BH CONSULTATION LIAISON PROGRESS NOTE - NSBHCHARTREVIEWVS_PSY_A_CORE FT
Vital Signs Last 24 Hrs  T(C): 36.7 (10 Apr 2024 19:10), Max: 36.7 (10 Apr 2024 19:10)  T(F): 98 (10 Apr 2024 19:10), Max: 98 (10 Apr 2024 19:10)  HR: 82 (10 Apr 2024 19:10) (82 - 82)  BP: 146/80 (10 Apr 2024 19:10) (146/80 - 146/80)  BP(mean): --  RR: 18 (10 Apr 2024 19:10) (18 - 18)  SpO2: 100% (10 Apr 2024 19:10) (100% - 100%)    Parameters below as of 10 Apr 2024 19:10  Patient On (Oxygen Delivery Method): room air    
Vital Signs Last 24 Hrs  T(C): 36.8 (16 Apr 2024 11:05), Max: 36.9 (15 Apr 2024 13:14)  T(F): 98.3 (16 Apr 2024 11:05), Max: 98.5 (15 Apr 2024 13:14)  HR: 119 (16 Apr 2024 11:05) (85 - 119)  BP: 129/62 (16 Apr 2024 11:05) (125/61 - 149/85)  BP(mean): --  RR: 19 (16 Apr 2024 11:05) (17 - 19)  SpO2: 97% (16 Apr 2024 11:05) (97% - 98%)    Parameters below as of 16 Apr 2024 11:05  Patient On (Oxygen Delivery Method): room air    
Vital Signs Last 24 Hrs  T(C): 36.5 (12 Apr 2024 06:30), Max: 36.5 (12 Apr 2024 06:30)  T(F): 97.7 (12 Apr 2024 06:30), Max: 97.7 (12 Apr 2024 06:30)  HR: 89 (12 Apr 2024 06:30) (89 - 95)  BP: 134/72 (12 Apr 2024 06:30) (134/72 - 135/109)  BP(mean): --  RR: 18 (12 Apr 2024 06:30) (18 - 18)  SpO2: 96% (12 Apr 2024 06:30) (96% - 99%)    Parameters below as of 12 Apr 2024 06:30  Patient On (Oxygen Delivery Method): room air    
Vital Signs Last 24 Hrs  T(C): 36.5 (15 Apr 2024 04:51), Max: 36.7 (14 Apr 2024 14:00)  T(F): 97.7 (15 Apr 2024 04:51), Max: 98.1 (14 Apr 2024 14:00)  HR: 86 (15 Apr 2024 04:51) (86 - 106)  BP: 154/91 (15 Apr 2024 04:51) (137/82 - 154/91)  BP(mean): --  RR: 18 (15 Apr 2024 04:51) (17 - 18)  SpO2: 96% (15 Apr 2024 04:51) (96% - 100%)    Parameters below as of 15 Apr 2024 04:51  Patient On (Oxygen Delivery Method): room air    
Vital Signs Last 24 Hrs  T(C): 36.6 (13 Apr 2024 11:34), Max: 37.1 (12 Apr 2024 18:09)  T(F): 97.9 (13 Apr 2024 11:34), Max: 98.8 (12 Apr 2024 18:09)  HR: 100 (13 Apr 2024 11:34) (93 - 106)  BP: 139/63 (13 Apr 2024 11:34) (103/84 - 139/63)  BP(mean): --  RR: 17 (13 Apr 2024 11:34) (17 - 18)  SpO2: 100% (13 Apr 2024 11:34) (97% - 100%)    Parameters below as of 13 Apr 2024 11:34  Patient On (Oxygen Delivery Method): room air

## 2024-04-16 NOTE — DISCHARGE NOTE NURSING/CASE MANAGEMENT/SOCIAL WORK - NSDCFUADDAPPT_GEN_ALL_CORE_FT
Follow up in Hepatology Clinic: [phone] ([facility] at [address] [place] [address]) or [phone] ([facility] at [address] [place] [address]) or [phone] ([facility] at [address] [place] [address])     follow up with Dr. Luna [phone] at  [facility]  appointment 5/20/24 12pm     follow up in methadone clinic

## 2024-04-16 NOTE — PROGRESS NOTE ADULT - PROBLEM SELECTOR PLAN 7
bcx neg  ECHO neg  WBC wnl  afeb  abx stopped  MELISSA ordered but low suspicion, will dc

## 2024-04-16 NOTE — DISCHARGE NOTE PROVIDER - NSDCCPTREATMENT_GEN_ALL_CORE_FT
PRINCIPAL PROCEDURE  Procedure: US RUQ abdomen  Findings and Treatment:   < end of copied text >  FINDINGS:  Liver: Visualized portions appear unremarkable.  Bile ducts: Normal caliber. Common bile duct measures 2 mm.  Gallbladder: Contracted gallbladder limiting assessment. Possible   gallbladder polyps versus adenomyomatosis. Negative sonographic Hcoi's   sign.  Pancreas: Poorly visualized.  Right kidney: 11.4 cm. No hydronephrosis.  Ascites: None.  IMPRESSION:  Contracted gallbladder limiting assessment. Multiple gallbladder polyps   measuring up to 0.4 cm, limited evaluation in contracted gallbladder.   Recommend outpatient sonographic follow-up.  No sonographic evidence of acute cholecystitis.< from: US Abdomen Upper Quadrant Right (04.13.24 @ 17:06) >

## 2024-04-16 NOTE — DISCHARGE NOTE PROVIDER - NSDCFUADDAPPT_GEN_ALL_CORE_FT
Follow up in Hepatology Clinic: [phone] ([facility] at [address] [place] [address]) or [phone] ([facility] at [address] [place] [address]) or [phone] ([facility] at [address] [place] [address])  Follow up in Hepatology Clinic: [phone] ([facility] at [address] [place] [address]) or [phone] ([facility] at [address] [place] [address]) or [phone] ([facility] at [address] [place] [address])     follow up with Dr. Luna [phone] at  [facility]  appointment 5/20/24 12pm     follow up in methadone clinic

## 2024-04-16 NOTE — PROGRESS NOTE ADULT - ATTENDING COMMENTS
personally saw and examined patient  labs and vitals reviewed  agree with above assessment and plan  no cp, euvolemic, comfortable appearing  with SI overnight, on one to one supervision, appreciate nabeel wiseman
- f/u MELD labs  - f/u workup as above  - outpatient f/u with hepatology - may need treatment of hepatitis B if she develops worsening liver injury
Elevated AST/ALT, acute hepatitis B suggested by HBcIgM, active IVDU, also on methadone 10 mg/d  - f/u labs as above, no treatment of hepatitis B for now - most cases resolve without treatment

## 2024-04-16 NOTE — BH CONSULTATION LIAISON PROGRESS NOTE - CURRENT MEDICATION
MEDICATIONS  (STANDING):  cefepime   IVPB 2000 milliGRAM(s) IV Intermittent every 8 hours  influenza   Vaccine 0.5 milliLiter(s) IntraMuscular once  methadone    Tablet 10 milliGRAM(s) Oral daily  nicotine - 21 mG/24Hr(s) Patch 1 Patch Transdermal daily  sodium chloride 0.9%. 1000 milliLiter(s) (75 mL/Hr) IV Continuous <Continuous>  vancomycin  IVPB 1000 milliGRAM(s) IV Intermittent every 12 hours    MEDICATIONS  (PRN):  acetaminophen     Tablet .. 650 milliGRAM(s) Oral every 6 hours PRN Temp greater or equal to 38C (100.4F), Mild Pain (1 - 3)  aluminum hydroxide/magnesium hydroxide/simethicone Suspension 30 milliLiter(s) Oral every 4 hours PRN Dyspepsia  haloperidol    Injectable 5 milliGRAM(s) IntraMuscular every 8 hours PRN Agitation  LORazepam   Injectable 2 milliGRAM(s) IntraMuscular every 8 hours PRN Agitation  melatonin 3 milliGRAM(s) Oral at bedtime PRN Insomnia  ondansetron Injectable 4 milliGRAM(s) IV Push every 8 hours PRN Nausea and/or Vomiting  
MEDICATIONS  (STANDING):  cefepime   IVPB 2000 milliGRAM(s) IV Intermittent every 8 hours  influenza   Vaccine 0.5 milliLiter(s) IntraMuscular once  methadone    Tablet 10 milliGRAM(s) Oral daily  nicotine - 21 mG/24Hr(s) Patch 1 Patch Transdermal daily  sodium chloride 0.9%. 1000 milliLiter(s) (75 mL/Hr) IV Continuous <Continuous>  vancomycin  IVPB 1000 milliGRAM(s) IV Intermittent every 12 hours    MEDICATIONS  (PRN):  acetaminophen     Tablet .. 650 milliGRAM(s) Oral every 6 hours PRN Temp greater or equal to 38C (100.4F), Mild Pain (1 - 3)  aluminum hydroxide/magnesium hydroxide/simethicone Suspension 30 milliLiter(s) Oral every 4 hours PRN Dyspepsia  haloperidol    Injectable 5 milliGRAM(s) IntraMuscular every 8 hours PRN Agitation  LORazepam   Injectable 2 milliGRAM(s) IntraMuscular every 8 hours PRN Agitation  melatonin 3 milliGRAM(s) Oral at bedtime PRN Insomnia  ondansetron Injectable 4 milliGRAM(s) IV Push every 8 hours PRN Nausea and/or Vomiting  
MEDICATIONS  (STANDING):  cefepime   IVPB 2000 milliGRAM(s) IV Intermittent every 8 hours  methadone    Tablet 10 milliGRAM(s) Oral daily  nicotine - 21 mG/24Hr(s) Patch 1 Patch Transdermal daily  sodium chloride 0.9%. 1000 milliLiter(s) (75 mL/Hr) IV Continuous <Continuous>  vancomycin  IVPB 1000 milliGRAM(s) IV Intermittent every 12 hours    MEDICATIONS  (PRN):  acetaminophen     Tablet .. 650 milliGRAM(s) Oral every 6 hours PRN Temp greater or equal to 38C (100.4F), Mild Pain (1 - 3)  aluminum hydroxide/magnesium hydroxide/simethicone Suspension 30 milliLiter(s) Oral every 4 hours PRN Dyspepsia  haloperidol    Injectable 5 milliGRAM(s) IntraMuscular every 8 hours PRN Agitation  LORazepam   Injectable 2 milliGRAM(s) IntraMuscular every 8 hours PRN Agitation  melatonin 3 milliGRAM(s) Oral at bedtime PRN Insomnia  ondansetron Injectable 4 milliGRAM(s) IV Push every 8 hours PRN Nausea and/or Vomiting  
MEDICATIONS  (STANDING):  influenza   Vaccine 0.5 milliLiter(s) IntraMuscular once  methadone    Tablet 10 milliGRAM(s) Oral daily  nicotine - 21 mG/24Hr(s) Patch 1 Patch Transdermal daily  OLANZapine 5 milliGRAM(s) Oral at bedtime    MEDICATIONS  (PRN):  acetaminophen     Tablet .. 650 milliGRAM(s) Oral every 6 hours PRN Temp greater or equal to 38C (100.4F), Mild Pain (1 - 3)  aluminum hydroxide/magnesium hydroxide/simethicone Suspension 30 milliLiter(s) Oral every 4 hours PRN Dyspepsia  haloperidol    Injectable 5 milliGRAM(s) IntraMuscular every 8 hours PRN Agitation  LORazepam   Injectable 2 milliGRAM(s) IntraMuscular every 8 hours PRN Agitation  melatonin 3 milliGRAM(s) Oral at bedtime PRN Insomnia  ondansetron Injectable 4 milliGRAM(s) IV Push every 8 hours PRN Nausea and/or Vomiting  
MEDICATIONS  (STANDING):  influenza   Vaccine 0.5 milliLiter(s) IntraMuscular once  methadone    Tablet 10 milliGRAM(s) Oral daily  nicotine - 21 mG/24Hr(s) Patch 1 Patch Transdermal daily  QUEtiapine 50 milliGRAM(s) Oral at bedtime    MEDICATIONS  (PRN):  acetaminophen     Tablet .. 650 milliGRAM(s) Oral every 6 hours PRN Temp greater or equal to 38C (100.4F), Mild Pain (1 - 3)  aluminum hydroxide/magnesium hydroxide/simethicone Suspension 30 milliLiter(s) Oral every 4 hours PRN Dyspepsia  haloperidol    Injectable 5 milliGRAM(s) IntraMuscular every 8 hours PRN Agitation  LORazepam   Injectable 2 milliGRAM(s) IntraMuscular every 8 hours PRN Agitation  melatonin 3 milliGRAM(s) Oral at bedtime PRN Insomnia  ondansetron Injectable 4 milliGRAM(s) IV Push every 8 hours PRN Nausea and/or Vomiting

## 2024-04-16 NOTE — PROGRESS NOTE ADULT - PROBLEM SELECTOR PLAN 3
will release fluid restriction and sodium not improving  pt appears dry
will release fluid restriction and sodium not improving  pt appears dry
improving
- Last use yesterday. Was receiving methadone form [facility] but unable to reach. Will start with methadone 10 mg qd  - Psych consulted for up titration
will release fluid restriction and sodium not improving  pt appears dry
- Last use 4/9.   Was receiving methadone form [facility] but pt intermittently on and off while using in between  currently does not feel like she is withdrawing  cont methadone 10 mg for now; no need to taper  she can return to [facility] upon dc to resume care, she is aware of their policy that she will need to start at low dosing again since she hasn't gone to get her daily meds

## 2024-04-17 LAB
HEV IGM SER QL: NEGATIVE — SIGNIFICANT CHANGE UP
SMOOTH MUSCLE AB SER-ACNC: ABNORMAL

## 2024-04-24 LAB
BCP MUTATIONS: SIGNIFICANT CHANGE UP
HBV GENTYP SERPL NAA+PROBE: SIGNIFICANT CHANGE UP
POLYMERASE MUTATIONS: DETECTED — SIGNIFICANT CHANGE UP
PRECORE MUTATIONS: SIGNIFICANT CHANGE UP

## 2024-06-06 NOTE — DISCHARGE NOTE PROVIDER - YES NO FOR MLM POSITIVE OR NEGATIVE COVID RESULT
, Outreach attempts to coordinate scheduling on the patient's Service to Spine order in workqueue [phone] requested on 5/23/2024 have been conducted.    Please contact Julissa if further coordination is needed.
